# Patient Record
Sex: FEMALE | URBAN - METROPOLITAN AREA
[De-identification: names, ages, dates, MRNs, and addresses within clinical notes are randomized per-mention and may not be internally consistent; named-entity substitution may affect disease eponyms.]

---

## 2018-01-19 ENCOUNTER — INPATIENT (INPATIENT)
Facility: HOSPITAL | Age: 68
LOS: 4 days | Discharge: HOME HEALTH | End: 2018-01-24
Attending: FAMILY MEDICINE | Admitting: PHYSICIAN ASSISTANT
Payer: MEDICARE

## 2018-01-19 VITALS — HEIGHT: 67 IN | WEIGHT: 194.01 LBS

## 2018-01-19 LAB
ALBUMIN SERPL ELPH-MCNC: 3.8 G/DL — SIGNIFICANT CHANGE UP (ref 3.3–5)
ALP SERPL-CCNC: 104 U/L — SIGNIFICANT CHANGE UP (ref 40–120)
ALT FLD-CCNC: 45 U/L — SIGNIFICANT CHANGE UP (ref 12–78)
ANION GAP SERPL CALC-SCNC: 6 MMOL/L — SIGNIFICANT CHANGE UP (ref 5–17)
APTT BLD: 31 SEC — SIGNIFICANT CHANGE UP (ref 27.5–37.4)
AST SERPL-CCNC: 23 U/L — SIGNIFICANT CHANGE UP (ref 15–37)
BASOPHILS # BLD AUTO: 0.1 K/UL — SIGNIFICANT CHANGE UP (ref 0–0.2)
BASOPHILS NFR BLD AUTO: 0.9 % — SIGNIFICANT CHANGE UP (ref 0–2)
BILIRUB SERPL-MCNC: 0.6 MG/DL — SIGNIFICANT CHANGE UP (ref 0.2–1.2)
BLD GP AB SCN SERPL QL: SIGNIFICANT CHANGE UP
BUN SERPL-MCNC: 24 MG/DL — HIGH (ref 7–23)
CALCIUM SERPL-MCNC: 9.5 MG/DL — SIGNIFICANT CHANGE UP (ref 8.5–10.1)
CHLORIDE SERPL-SCNC: 105 MMOL/L — SIGNIFICANT CHANGE UP (ref 96–108)
CO2 SERPL-SCNC: 29 MMOL/L — SIGNIFICANT CHANGE UP (ref 22–31)
CREAT SERPL-MCNC: 1 MG/DL — SIGNIFICANT CHANGE UP (ref 0.5–1.3)
EOSINOPHIL # BLD AUTO: 0.1 K/UL — SIGNIFICANT CHANGE UP (ref 0–0.5)
EOSINOPHIL NFR BLD AUTO: 0.7 % — SIGNIFICANT CHANGE UP (ref 0–6)
ERYTHROCYTE [SEDIMENTATION RATE] IN BLOOD: 4 MM/HR — SIGNIFICANT CHANGE UP (ref 0–20)
GLUCOSE SERPL-MCNC: 120 MG/DL — HIGH (ref 70–99)
HCT VFR BLD CALC: 41 % — SIGNIFICANT CHANGE UP (ref 34.5–45)
HGB BLD-MCNC: 13.6 G/DL — SIGNIFICANT CHANGE UP (ref 11.5–15.5)
INR BLD: 1.05 RATIO — SIGNIFICANT CHANGE UP (ref 0.88–1.16)
LACTATE SERPL-SCNC: 0.9 MMOL/L — SIGNIFICANT CHANGE UP (ref 0.7–2)
LYMPHOCYTES # BLD AUTO: 2.6 K/UL — SIGNIFICANT CHANGE UP (ref 1–3.3)
LYMPHOCYTES # BLD AUTO: 24.7 % — SIGNIFICANT CHANGE UP (ref 13–44)
MCHC RBC-ENTMCNC: 28.5 PG — SIGNIFICANT CHANGE UP (ref 27–34)
MCHC RBC-ENTMCNC: 33.2 GM/DL — SIGNIFICANT CHANGE UP (ref 32–36)
MCV RBC AUTO: 86 FL — SIGNIFICANT CHANGE UP (ref 80–100)
MONOCYTES # BLD AUTO: 0.8 K/UL — SIGNIFICANT CHANGE UP (ref 0–0.9)
MONOCYTES NFR BLD AUTO: 8 % — SIGNIFICANT CHANGE UP (ref 2–14)
NEUTROPHILS # BLD AUTO: 6.8 K/UL — SIGNIFICANT CHANGE UP (ref 1.8–7.4)
NEUTROPHILS NFR BLD AUTO: 65.8 % — SIGNIFICANT CHANGE UP (ref 43–77)
PLATELET # BLD AUTO: 270 K/UL — SIGNIFICANT CHANGE UP (ref 150–400)
POTASSIUM SERPL-MCNC: 3.8 MMOL/L — SIGNIFICANT CHANGE UP (ref 3.5–5.3)
POTASSIUM SERPL-SCNC: 3.8 MMOL/L — SIGNIFICANT CHANGE UP (ref 3.5–5.3)
PROT SERPL-MCNC: 7.6 GM/DL — SIGNIFICANT CHANGE UP (ref 6–8.3)
PROTHROM AB SERPL-ACNC: 11.3 SEC — SIGNIFICANT CHANGE UP (ref 9.8–12.7)
RBC # BLD: 4.77 M/UL — SIGNIFICANT CHANGE UP (ref 3.8–5.2)
RBC # FLD: 12.7 % — SIGNIFICANT CHANGE UP (ref 10.3–14.5)
SODIUM SERPL-SCNC: 140 MMOL/L — SIGNIFICANT CHANGE UP (ref 135–145)
TYPE + AB SCN PNL BLD: SIGNIFICANT CHANGE UP
WBC # BLD: 10.4 K/UL — SIGNIFICANT CHANGE UP (ref 3.8–10.5)
WBC # FLD AUTO: 10.4 K/UL — SIGNIFICANT CHANGE UP (ref 3.8–10.5)

## 2018-01-19 PROCEDURE — 99285 EMERGENCY DEPT VISIT HI MDM: CPT

## 2018-01-19 PROCEDURE — 73140 X-RAY EXAM OF FINGER(S): CPT | Mod: 26,LT

## 2018-01-19 RX ORDER — VANCOMYCIN HCL 1 G
1000 VIAL (EA) INTRAVENOUS EVERY 12 HOURS
Refills: 0 | Status: DISCONTINUED | OUTPATIENT
Start: 2018-01-19 | End: 2018-01-22

## 2018-01-19 RX ORDER — VANCOMYCIN HCL 1 G
1000 VIAL (EA) INTRAVENOUS ONCE
Refills: 0 | Status: COMPLETED | OUTPATIENT
Start: 2018-01-19 | End: 2018-01-19

## 2018-01-19 RX ORDER — DOCUSATE SODIUM 100 MG
100 CAPSULE ORAL THREE TIMES A DAY
Refills: 0 | Status: DISCONTINUED | OUTPATIENT
Start: 2018-01-19 | End: 2018-01-24

## 2018-01-19 RX ORDER — SODIUM CHLORIDE 9 MG/ML
2000 INJECTION INTRAMUSCULAR; INTRAVENOUS; SUBCUTANEOUS ONCE
Refills: 0 | Status: COMPLETED | OUTPATIENT
Start: 2018-01-19 | End: 2018-01-19

## 2018-01-19 RX ORDER — SODIUM CHLORIDE 9 MG/ML
1000 INJECTION INTRAMUSCULAR; INTRAVENOUS; SUBCUTANEOUS
Refills: 0 | Status: DISCONTINUED | OUTPATIENT
Start: 2018-01-19 | End: 2018-01-24

## 2018-01-19 RX ORDER — OXYCODONE HYDROCHLORIDE 5 MG/1
10 TABLET ORAL EVERY 6 HOURS
Refills: 0 | Status: DISCONTINUED | OUTPATIENT
Start: 2018-01-19 | End: 2018-01-24

## 2018-01-19 RX ORDER — PIPERACILLIN AND TAZOBACTAM 4; .5 G/20ML; G/20ML
3.38 INJECTION, POWDER, LYOPHILIZED, FOR SOLUTION INTRAVENOUS EVERY 8 HOURS
Refills: 0 | Status: DISCONTINUED | OUTPATIENT
Start: 2018-01-19 | End: 2018-01-21

## 2018-01-19 RX ORDER — AMPICILLIN SODIUM AND SULBACTAM SODIUM 250; 125 MG/ML; MG/ML
3 INJECTION, POWDER, FOR SUSPENSION INTRAMUSCULAR; INTRAVENOUS ONCE
Refills: 0 | Status: COMPLETED | OUTPATIENT
Start: 2018-01-19 | End: 2018-01-19

## 2018-01-19 RX ORDER — ACETAMINOPHEN 500 MG
650 TABLET ORAL ONCE
Refills: 0 | Status: COMPLETED | OUTPATIENT
Start: 2018-01-19 | End: 2018-01-19

## 2018-01-19 RX ADMIN — SODIUM CHLORIDE 2000 MILLILITER(S): 9 INJECTION INTRAMUSCULAR; INTRAVENOUS; SUBCUTANEOUS at 21:49

## 2018-01-19 RX ADMIN — AMPICILLIN SODIUM AND SULBACTAM SODIUM 200 GRAM(S): 250; 125 INJECTION, POWDER, FOR SUSPENSION INTRAMUSCULAR; INTRAVENOUS at 21:50

## 2018-01-19 RX ADMIN — Medication 250 MILLIGRAM(S): at 23:29

## 2018-01-19 RX ADMIN — Medication 650 MILLIGRAM(S): at 21:49

## 2018-01-19 NOTE — ED STATDOCS - SKIN, MLM
skin normal color for race, warm, dry and intact. Erythema and fluctuance to PIP of left index finger with localized tenderness to the PIP.  No drainage, erythema.  + tracking up LUE forearm with warmth and erythema associated with second and third MCP on dorsal side of the left hand.

## 2018-01-19 NOTE — ED STATDOCS - MEDICAL DECISION MAKING DETAILS
Pt with left pointer finger lymphangitis s/p lac 2 weeks ago.  Will give IV abx and consult Ortho Hand. Statement Selected

## 2018-01-19 NOTE — CONSULT NOTE ADULT - ASSESSMENT
A/P: 67y Female with L index finger and forearm cellulitis  Pain control  WBAT  elevation   Active movement of fingers encouraged  warm compresses  IV abx, ID recs appreciated  monitor clinically for improvement  no signs of septic arthritis at this point  discussed with dr oliveros  will monitor for possible I&D vs aspiration depending on response to abx  will follow

## 2018-01-19 NOTE — ED STATDOCS - PROGRESS NOTE DETAILS
left 2nd digit with erythema LROM at PIP, and minimally at DIP, swelling over dorsum of ;eft 2nd PIP, subtle streaking up hand to distal forearm, otherwise pt with FROM of wrist, radial and ulnar pulses 2+, FROM at wrist, capillary brisk WNL throughout finger tips.  strength 4/5 due to pain, no focal motor or sensory deficits. I spoke with Dr White, he agrees with the need for admission IV abx, he will come to asses pt in AM, Dr Gutierrez to admit pt to hospitalist service. I discussed with pt and  at bedside tx plan and need for abx administration

## 2018-01-19 NOTE — ED STATDOCS - ATTENDING CONTRIBUTION TO CARE
Attending Contribution to Care: I, Brandi Sabillon, performed the initial face to face bedside interview with this patient regarding history of present illness, review of symptoms and relevant past medical, social and family history.  I completed an independent physical examination.  I was the initial provider who evaluated this patient. I have signed out the follow up of any pending tests (i.e. labs, radiological studies) to the ACP.  I have communicated the patient’s plan of care and disposition with the ACP.

## 2018-01-19 NOTE — CONSULT NOTE ADULT - SUBJECTIVE AND OBJECTIVE BOX
67y Female RHD presents c/o L Index finger pain/swelling/erythema sp knife laceration 12 days ago. Denies Denies numbness/tingling. Denies fever/chills. Denies pain/injury elsewhere. No other complaints. States she was cutting up dinner and ended up cutting over the dorsal aspect of her left index PIP joint, left small finger PIP joint and right small finger PIP joint. States that she treated it on her own at home without sequelae. Yesterday however, her left index finger PIP began to become erythematous/swollen and painful. She attempted warm soaks. She went to urgent care today and was sent to the ED for possible cellulitis and lymphangitis and the erythema began tracking up the forearm. No other illnesses or infections recently. She has in the past (many years ago) has herpetic jovan in one of her fingers which required an I&D.     HEALTH ISSUES - PROBLEM Dx:        MEDICATIONS  (STANDING):  sodium chloride 0.9%. 1000 milliLiter(s) IV Continuous <Continuous>  vancomycin  IVPB 1000 milliGRAM(s) IV Intermittent once    Allergies    latex (Unknown)  sulfa drugs (Unknown)    Intolerances                                13.6   10.4  )-----------( 270      ( 19 Jan 2018 21:25 )             41.0     19 Jan 2018 21:25    140    |  105    |  24     ----------------------------<  120    3.8     |  29     |  1.00     Ca    9.5        19 Jan 2018 21:25    TPro  7.6    /  Alb  3.8    /  TBili  0.6    /  DBili  x      /  AST  23     /  ALT  45     /  AlkPhos  104    19 Jan 2018 21:25    PT/INR - ( 19 Jan 2018 21:25 )   PT: 11.3 sec;   INR: 1.05 ratio         PTT - ( 19 Jan 2018 21:25 )  PTT:31.0 sec  Vital Signs Last 24 Hrs  T(C): 36.9 (01-19-18 @ 20:41), Max: 36.9 (01-19-18 @ 20:41)  T(F): 98.4 (01-19-18 @ 20:41), Max: 98.4 (01-19-18 @ 20:41)  HR: 89 (01-19-18 @ 20:41) (89 - 89)  BP: 118/74 (01-19-18 @ 20:41) (118/74 - 118/74)  BP(mean): --  RR: 16 (01-19-18 @ 20:41) (16 - 16)  SpO2: 100% (01-19-18 @ 20:41) (100% - 100%)    Imaging: XR demonstrates no fx/dislocation or foreign body     Physical Exam  Gen: NAD  LUE: Skin intact, compartments soft, warm well perfused, rdial pulse intact, +r/u/m/ain/pin function, silt, +0.5 cm scab over dorsal index finger PIP joint with swelling/erythema and TTP, AROM with pain of index PIP joint limited, PROM index PIP joint 0-45 with pain at end flexion, +erythema tracking proximally up forearm, +small scab with fluid collection over  dorsal aspect of small finger PIP joint without erythema/TTP and full painless AROM, full AROM without pain to all other fingers without erythema or signs of infection  R Hand: +small scab over dorsal aspect of small finger pip joint with fluid collection without erythema/TTP, full painless AROM all joint, no signs of infection/cellulitis/phlebitis, NV intact

## 2018-01-19 NOTE — ED STATDOCS - OBJECTIVE STATEMENT
67f presents with abscess to left pointer finger.  Pt states on 1/6/18 she accidentally cut the knuckles on her left pinky, left pointer, and right pinky fingers with a knife.  A few days later she developed redness, swelling, pain to the left index finger.

## 2018-01-19 NOTE — ED ADULT NURSE NOTE - OBJECTIVE STATEMENT
Patient comes to ED for left index finger possible infection. pt cut finger on a knife a few days ago and swelling and redness began. pt noticed 4 hours later streaking up her arm.

## 2018-01-19 NOTE — H&P ADULT - HISTORY OF PRESENT ILLNESS
68 yo F with no significant PMH, p/w L 2nd digit swelling. Patient states she cut her finger on 1/6/18. It seemed like it was healing initially, but then she started noticing that it became boggy around her PIP joint and the swelling spread to her DIP joint, to her wrist and started tracking down her arm. Patient is not able to make a fist due to pain. Denies fever / chills / nausea /vomiting / CP / SOB.     PSH: hip surgery, gastric sleeve  Family Hx: Sister - breast cancer  Social hx: tobacco - quit 30 years ago, Denies etoh / drugs, lives at home with family

## 2018-01-19 NOTE — H&P ADULT - ASSESSMENT
68 yo F with no significant PMH, p/w L 2nd digit swelling    *L 2nd digit cellulitis   -ESR / CRP -> based on results may need MRI to r/o OM  -Vanco / zosyn  -Ortho hand consult  -ID consult  -Blood cx  -Pain control PRN  -Lactate = 0.9    *Dehydration   -Elevated BUN / creatinine ratio with dry oral mucosa  -IVF     *DVT ppx  -SCDS 66 yo F with no significant PMH, p/w L 2nd digit swelling    *L 2nd digit cellulitis   -ESR / CRP -> based on results may need MRI to r/o OM  -Vanco / zosyn  -Ortho hand consult  -ID consult  -Blood cx  -Pain control PRN  -Lactate = 0.9    *Dehydration   -Elevated BUN / creatinine ratio with dry oral mucosa  -IVF     *DVT ppx  -SCDS

## 2018-01-20 LAB
ABO RH CONFIRMATION: SIGNIFICANT CHANGE UP
ALBUMIN SERPL ELPH-MCNC: 3 G/DL — LOW (ref 3.3–5)
ALP SERPL-CCNC: 78 U/L — SIGNIFICANT CHANGE UP (ref 40–120)
ALT FLD-CCNC: 30 U/L — SIGNIFICANT CHANGE UP (ref 12–78)
ANION GAP SERPL CALC-SCNC: 5 MMOL/L — SIGNIFICANT CHANGE UP (ref 5–17)
AST SERPL-CCNC: 17 U/L — SIGNIFICANT CHANGE UP (ref 15–37)
BASOPHILS # BLD AUTO: 0.1 K/UL — SIGNIFICANT CHANGE UP (ref 0–0.2)
BASOPHILS NFR BLD AUTO: 1.3 % — SIGNIFICANT CHANGE UP (ref 0–2)
BILIRUB SERPL-MCNC: 0.7 MG/DL — SIGNIFICANT CHANGE UP (ref 0.2–1.2)
BUN SERPL-MCNC: 16 MG/DL — SIGNIFICANT CHANGE UP (ref 7–23)
CALCIUM SERPL-MCNC: 8.5 MG/DL — SIGNIFICANT CHANGE UP (ref 8.5–10.1)
CHLORIDE SERPL-SCNC: 110 MMOL/L — HIGH (ref 96–108)
CO2 SERPL-SCNC: 29 MMOL/L — SIGNIFICANT CHANGE UP (ref 22–31)
CREAT SERPL-MCNC: 0.76 MG/DL — SIGNIFICANT CHANGE UP (ref 0.5–1.3)
CRP SERPL-MCNC: 0.6 MG/DL — HIGH (ref 0–0.4)
EOSINOPHIL # BLD AUTO: 0.1 K/UL — SIGNIFICANT CHANGE UP (ref 0–0.5)
EOSINOPHIL NFR BLD AUTO: 1.6 % — SIGNIFICANT CHANGE UP (ref 0–6)
GLUCOSE SERPL-MCNC: 93 MG/DL — SIGNIFICANT CHANGE UP (ref 70–99)
HCT VFR BLD CALC: 36.4 % — SIGNIFICANT CHANGE UP (ref 34.5–45)
HGB BLD-MCNC: 12 G/DL — SIGNIFICANT CHANGE UP (ref 11.5–15.5)
LACTATE SERPL-SCNC: 0.7 MMOL/L — SIGNIFICANT CHANGE UP (ref 0.7–2)
LYMPHOCYTES # BLD AUTO: 2.6 K/UL — SIGNIFICANT CHANGE UP (ref 1–3.3)
LYMPHOCYTES # BLD AUTO: 35.9 % — SIGNIFICANT CHANGE UP (ref 13–44)
MAGNESIUM SERPL-MCNC: 2.1 MG/DL — SIGNIFICANT CHANGE UP (ref 1.6–2.6)
MCHC RBC-ENTMCNC: 28.7 PG — SIGNIFICANT CHANGE UP (ref 27–34)
MCHC RBC-ENTMCNC: 32.9 GM/DL — SIGNIFICANT CHANGE UP (ref 32–36)
MCV RBC AUTO: 87.3 FL — SIGNIFICANT CHANGE UP (ref 80–100)
MONOCYTES # BLD AUTO: 0.8 K/UL — SIGNIFICANT CHANGE UP (ref 0–0.9)
MONOCYTES NFR BLD AUTO: 10.4 % — SIGNIFICANT CHANGE UP (ref 2–14)
NEUTROPHILS # BLD AUTO: 3.7 K/UL — SIGNIFICANT CHANGE UP (ref 1.8–7.4)
NEUTROPHILS NFR BLD AUTO: 50.8 % — SIGNIFICANT CHANGE UP (ref 43–77)
PHOSPHATE SERPL-MCNC: 3.7 MG/DL — SIGNIFICANT CHANGE UP (ref 2.5–4.5)
PLATELET # BLD AUTO: 212 K/UL — SIGNIFICANT CHANGE UP (ref 150–400)
POTASSIUM SERPL-MCNC: 4.2 MMOL/L — SIGNIFICANT CHANGE UP (ref 3.5–5.3)
POTASSIUM SERPL-SCNC: 4.2 MMOL/L — SIGNIFICANT CHANGE UP (ref 3.5–5.3)
PROT SERPL-MCNC: 5.8 GM/DL — LOW (ref 6–8.3)
RBC # BLD: 4.16 M/UL — SIGNIFICANT CHANGE UP (ref 3.8–5.2)
RBC # FLD: 12.6 % — SIGNIFICANT CHANGE UP (ref 10.3–14.5)
SODIUM SERPL-SCNC: 144 MMOL/L — SIGNIFICANT CHANGE UP (ref 135–145)
WBC # BLD: 7.3 K/UL — SIGNIFICANT CHANGE UP (ref 3.8–10.5)
WBC # FLD AUTO: 7.3 K/UL — SIGNIFICANT CHANGE UP (ref 3.8–10.5)

## 2018-01-20 RX ADMIN — Medication 250 MILLIGRAM(S): at 22:33

## 2018-01-20 RX ADMIN — Medication 250 MILLIGRAM(S): at 00:48

## 2018-01-20 RX ADMIN — Medication 250 MILLIGRAM(S): at 11:41

## 2018-01-20 RX ADMIN — PIPERACILLIN AND TAZOBACTAM 25 GRAM(S): 4; .5 INJECTION, POWDER, LYOPHILIZED, FOR SOLUTION INTRAVENOUS at 05:23

## 2018-01-20 RX ADMIN — PIPERACILLIN AND TAZOBACTAM 25 GRAM(S): 4; .5 INJECTION, POWDER, LYOPHILIZED, FOR SOLUTION INTRAVENOUS at 15:52

## 2018-01-20 NOTE — PROGRESS NOTE ADULT - SUBJECTIVE AND OBJECTIVE BOX
Patient seen and examined. Pain controlled. States fullness in left index finger and hand seems mildly improved, erythema slightly improved. denies fever/chills.     HEALTH ISSUES - PROBLEM Dx:        MEDICATIONS  (STANDING):  sodium chloride 0.9%. 1000 milliLiter(s) IV Continuous <Continuous>  vancomycin  IVPB 1000 milliGRAM(s) IV Intermittent once    Allergies    latex (Unknown)  sulfa drugs (Unknown)    Intolerances                                13.6   10.4  )-----------( 270      ( 19 Jan 2018 21:25 )             41.0     19 Jan 2018 21:25    140    |  105    |  24     ----------------------------<  120    3.8     |  29     |  1.00     Ca    9.5        19 Jan 2018 21:25    TPro  7.6    /  Alb  3.8    /  TBili  0.6    /  DBili  x      /  AST  23     /  ALT  45     /  AlkPhos  104    19 Jan 2018 21:25    PT/INR - ( 19 Jan 2018 21:25 )   PT: 11.3 sec;   INR: 1.05 ratio         PTT - ( 19 Jan 2018 21:25 )  PTT:31.0 sec  Vital Signs Last 24 Hrs  T(C): 36.9 (01-19-18 @ 20:41), Max: 36.9 (01-19-18 @ 20:41)  T(F): 98.4 (01-19-18 @ 20:41), Max: 98.4 (01-19-18 @ 20:41)  HR: 89 (01-19-18 @ 20:41) (89 - 89)  BP: 118/74 (01-19-18 @ 20:41) (118/74 - 118/74)  BP(mean): --  RR: 16 (01-19-18 @ 20:41) (16 - 16)  SpO2: 100% (01-19-18 @ 20:41) (100% - 100%)    Physical Exam  Gen: NAD  LUE: Skin intact, compartments soft, warm well perfused, rdial pulse intact, +r/u/m/ain/pin function, silt, +0.5 cm scab over dorsal index finger PIP joint with swelling/erythema and TTP, AROM with pain of index PIP joint limited, PROM index PIP joint 0-45 with pain at end flexion, +erythema tracking proximally up forearm which was demarcated and slightly improved today, +small scab with fluid collection over  dorsal aspect of small finger PIP joint without erythema/TTP and full painless AROM, full AROM without pain to all other fingers without erythema or signs of infection  R Hand: +small scab over dorsal aspect of small finger pip joint with fluid collection without erythema/TTP, full painless AROM all joint, no signs of infection/cellulitis/phlebitis, NV intact

## 2018-01-20 NOTE — PROGRESS NOTE ADULT - SUBJECTIVE AND OBJECTIVE BOX
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 Patient states she cut her finger on 1/6/18. It seemed like it was healing initially, but then she started noticing that it became boggy around her PIP joint and the swelling spread to her DIP joint, to her wrist and started tracking down her arm. Patient was not able to make a fist due to pain. Denied fever / chills / nausea /vomiting / CP / SOB. She was admitted with cellulitis/lymphangitis and to r/o underlying abscess/OM/tenosynovitis.    1/20: pt seen and examined this am. Proximal streaking improving, but joint still swollen/tender and red.      Review of system- All 10 systems reviewed and is as per HPI otherwise negative.       VITALS  T(C): 36.7 (01-20-18 @ 12:01), Max: 36.9 (01-19-18 @ 20:41)  HR: 73 (01-20-18 @ 12:01) (64 - 89)  BP: 101/66 (01-20-18 @ 12:01) (92/43 - 118/74)  RR: 16 (01-20-18 @ 12:01) (15 - 16)  SpO2: 100% (01-20-18 @ 12:01) (98% - 100%)  Wt(kg): --    PHYSICAL EXAM:    GENERAL: Comfortable, no acute distress  HEAD:  Atraumatic, Normocephalic  EYES: EOMI, PERRLA  HEENT: Moist mucous membranes  NECK: Supple, No JVD  NERVOUS SYSTEM:  Alert & Oriented X3, Motor Strength 5/5 B/L upper and lower extremities  CHEST/LUNG: Clear to auscultation bilaterally  HEART: Regular rate and rhythm; No murmurs, rubs, or gallops  ABDOMEN: Soft, Nontender, Nondistended; Bowel sounds present  GENITOURINARY- Voiding, no palpable bladder  EXTREMITIES:  Left 2nd digit with erythema/swelling/tenderness over PIP joint, no active drainage, some fading proximal streaking.  MUSCULOSKELETAL- No muscle tenderness,   SKIN-no rash        LABS:                        12.0   7.3   )-----------( 212      ( 20 Jan 2018 05:50 )             36.4     01-20    144  |  110<H>  |  16  ----------------------------<  93  4.2   |  29  |  0.76    Ca    8.5      20 Jan 2018 05:50  Phos  3.7     01-20  Mg     2.1     01-20    TPro  5.8<L>  /  Alb  3.0<L>  /  TBili  0.7  /  DBili  x   /  AST  17  /  ALT  30  /  AlkPhos  78  01-20    PT/INR - ( 19 Jan 2018 21:25 )   PT: 11.3 sec;   INR: 1.05 ratio         PTT - ( 19 Jan 2018 21:25 )  PTT:31.0 sec      CAPILLARY BLOOD GLUCOSE    MEDS  docusate sodium 100 milliGRAM(s) Oral three times a day PRN  oxyCODONE    IR 10 milliGRAM(s) Oral every 6 hours PRN  piperacillin/tazobactam IVPB. 3.375 Gram(s) IV Intermittent every 8 hours  sodium chloride 0.9%. 1000 milliLiter(s) IV Continuous <Continuous>  vancomycin  IVPB 1000 milliGRAM(s) IV Intermittent every 12 hours    ASSESSMENT AND PLAN:  67F, PMH AS ABOVE A/W:    1. Left 2nd digit cellulitis with possible underlying abscess/tenosynovitis vs. om  -ID eval appreciated  -continue iv abx.  -may need further imaging vs. I+D  -ortho following  -f/u blood cx  -pain control    2. Dehydration:  -improving with ivf    3. 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 Patient states she cut her finger on 1/6/18. It seemed like it was healing initially, but then she started noticing that it became boggy around her PIP joint and the swelling spread to her DIP joint, to her wrist and started tracking down her arm. Patient was not able to make a fist due to pain. Denied fever / chills / nausea /vomiting / CP / SOB. She was admitted with cellulitis/lymphangitis and to r/o underlying abscess/OM/tenosynovitis.    1/20: pt seen and examined this am. Proximal streaking improving, but joint still swollen/tender and red.      Review of system- All 10 systems reviewed and is as per HPI otherwise negative.       VITALS  T(C): 36.7 (01-20-18 @ 12:01), Max: 36.9 (01-19-18 @ 20:41)  HR: 73 (01-20-18 @ 12:01) (64 - 89)  BP: 101/66 (01-20-18 @ 12:01) (92/43 - 118/74)  RR: 16 (01-20-18 @ 12:01) (15 - 16)  SpO2: 100% (01-20-18 @ 12:01) (98% - 100%)  Wt(kg): --    PHYSICAL EXAM:    GENERAL: Comfortable, no acute distress  HEAD:  Atraumatic, Normocephalic  EYES: EOMI, PERRLA  HEENT: Moist mucous membranes  NECK: Supple, No JVD  NERVOUS SYSTEM:  Alert & Oriented X3, Motor Strength 5/5 B/L upper and lower extremities  CHEST/LUNG: Clear to auscultation bilaterally  HEART: Regular rate and rhythm; No murmurs, rubs, or gallops  ABDOMEN: Soft, Nontender, Nondistended; Bowel sounds present  GENITOURINARY- Voiding, no palpable bladder  EXTREMITIES:  Left 2nd digit with erythema/swelling/tenderness over PIP joint, no active drainage, some fading proximal streaking.  MUSCULOSKELETAL- No muscle tenderness,   SKIN-no rash        LABS:                        12.0   7.3   )-----------( 212      ( 20 Jan 2018 05:50 )             36.4     01-20    144  |  110<H>  |  16  ----------------------------<  93  4.2   |  29  |  0.76    Ca    8.5      20 Jan 2018 05:50  Phos  3.7     01-20  Mg     2.1     01-20    TPro  5.8<L>  /  Alb  3.0<L>  /  TBili  0.7  /  DBili  x   /  AST  17  /  ALT  30  /  AlkPhos  78  01-20    PT/INR - ( 19 Jan 2018 21:25 )   PT: 11.3 sec;   INR: 1.05 ratio         PTT - ( 19 Jan 2018 21:25 )  PTT:31.0 sec      CAPILLARY BLOOD GLUCOSE    MEDS  docusate sodium 100 milliGRAM(s) Oral three times a day PRN  oxyCODONE    IR 10 milliGRAM(s) Oral every 6 hours PRN  piperacillin/tazobactam IVPB. 3.375 Gram(s) IV Intermittent every 8 hours  sodium chloride 0.9%. 1000 milliLiter(s) IV Continuous <Continuous>  vancomycin  IVPB 1000 milliGRAM(s) IV Intermittent every 12 hours    ASSESSMENT AND PLAN:  67F, PMH AS ABOVE A/W:    1. Left 2nd digit cellulitis with possible deep tissue involvement:  -ID eval appreciated  -continue iv abx.  -may need further imaging vs. I+D  -ortho following  -f/u blood cx  -pain control    2. Dehydration:  -improving with ivf    3. dvt px:  ambulate,

## 2018-01-20 NOTE — CONSULT NOTE ADULT - SUBJECTIVE AND OBJECTIVE BOX
HPI:  66 yo F with past medical history gastric sleeve and hip surgery in past now admitted on 1/19 for evaluation of left second finger infection; of note the patient cut her finger on a filet knife on 1/6 that was sitting in dishwater; she cleaned the wound with "organic antibiotic-silver colloid" which is made with distilled water. Subsequently a pipe broke in her house and she used towel to clean up the dirty basement water and exposed her finger to this as well. Over time the second PIP joint became swollen and boggy with crusting at the initial skin defect, redness started to develop distally and up her wrist, tracking up her arm prompting visit to [facility], referred to ED. She can not bend the finger, denies any other specific complaint, no fever.        PMH: as above  PSH: as above  Meds: per reconcilation sheet, noted below  MEDICATIONS  (STANDING):  piperacillin/tazobactam IVPB. 3.375 Gram(s) IV Intermittent every 8 hours  sodium chloride 0.9%. 1000 milliLiter(s) (75 mL/Hr) IV Continuous <Continuous>  vancomycin  IVPB 1000 milliGRAM(s) IV Intermittent every 12 hours    MEDICATIONS  (PRN):  docusate sodium 100 milliGRAM(s) Oral three times a day PRN Constipation  oxyCODONE    IR 10 milliGRAM(s) Oral every 6 hours PRN Moderate Pain (4 - 6)    Allergies    latex (Unknown)  sulfa drugs (Unknown)    Intolerances      Social: past smoking, no alcohol, no illegal drugs; no recent travel, no exposure to TB  FAMILY HISTORY:    ROS: the patient has no fever, no chills, no HA, no dizziness, no sore throat, no blurry vision, no CP, no palpitations, no SOB, no cough, no abdominal pain, no diarrhea, no N/V, no dysuria, no leg pain, no claudication, no rash,  no rectal pain or bleeding, no night sweats  Vital Signs Last 24 Hrs  T(C): 36.7 (20 Jan 2018 12:01), Max: 36.9 (19 Jan 2018 20:41)  T(F): 98 (20 Jan 2018 12:01), Max: 98.4 (19 Jan 2018 20:41)  HR: 73 (20 Jan 2018 12:01) (64 - 89)  BP: 101/66 (20 Jan 2018 12:01) (92/43 - 118/74)  BP(mean): --  RR: 16 (20 Jan 2018 12:01) (15 - 16)  SpO2: 100% (20 Jan 2018 12:01) (98% - 100%)  Daily Height in cm: 170.18 (19 Jan 2018 20:35)    Daily   Constitutional: nontoxic appearing  HEENT: NC/AT, EOMI, PERRLA  Neck: supple  Respiratory: clear, no r/r/w  Cardiovascular: S1S2 regular, no murmurs  Abdomen: soft, not tender, not distended, positive BS  Genitourinary: deferred  Rectal: deferred  Musculoskeletal: left second finger with marked edema, bogginess over PIP joint with erythema from tip of finger to wrist, tender to touch, eschar over PIP  Neurological: AxOx3, moving all extremities, no focal deficits  Skin: no rashes                          12.0   7.3   )-----------( 212      ( 20 Jan 2018 05:50 )             36.4     01-20    144  |  110<H>  |  16  ----------------------------<  93  4.2   |  29  |  0.76    Ca    8.5      20 Jan 2018 05:50  Phos  3.7     01-20  Mg     2.1     01-20    TPro  5.8<L>  /  Alb  3.0<L>  /  TBili  0.7  /  DBili  x   /  AST  17  /  ALT  30  /  AlkPhos  78  01-20     LIVER FUNCTIONS - ( 20 Jan 2018 05:50 )  Alb: 3.0 g/dL / Pro: 5.8 gm/dL / ALK PHOS: 78 U/L / ALT: 30 U/L / AST: 17 U/L / GGT: x                 Radiology:< from: Xray Finger, Left Hand (01.19.18 @ 21:23) >  IMPRESSION:   No fracture or dislocation. Narrowing at the first carpal   metacarpal joint space.           < end of copied text >      Advanced directive addressed: full resuscitation

## 2018-01-20 NOTE — PATIENT PROFILE ADULT. - VISION (WITH CORRECTIVE LENSES IF THE PATIENT USUALLY WEARS THEM):
adequate with eyeglasses/Partially impaired: cannot see medication labels or newsprint, but can see obstacles in path, and the surrounding layout; can count fingers at arm's length

## 2018-01-20 NOTE — PROGRESS NOTE ADULT - ASSESSMENT
A/P: 67y Female with L index finger and forearm cellulitis  Pain control  WBAT  elevation   Active movement of fingers encouraged  warm compresses  IV abx, ID recs appreciated  monitor clinically for improvement  no signs of septic arthritis at this point  will monitor for possible I&D vs aspiration depending on response to abx  will follow

## 2018-01-20 NOTE — CONSULT NOTE ADULT - ASSESSMENT
66 yo F with past medical history gastric sleeve and hip surgery in past now admitted on 1/19 for evaluation of left second finger infection; of note the patient cut her finger on a filet knife on 1/6 that was sitting in dishwater; she cleaned the wound with "organic antibiotic-silver colloid" which is made with distilled water. Subsequently a pipe broke in her house and she used towel to clean up the dirty basement water and exposed her finger to this as well. Over time the second PIP joint became swollen and boggy with crusting at the initial skin defect, redness started to develop distally and up her wrist, tracking up her arm prompting visit to [facility], referred to ED. She can not bend the finger, denies any other specific complaint, no fever.  1. Patient admitted with left hand cellulitis, possible septic arthritis versus tenosynovitis, versus osteomyelitis  - follow up cultures   - iv hydration and supportive care   - serial cbc and monitor temperature   - agree with zosyn as ordered given the water exposures, to cover Pseudomonas, gram negative rods, staph, streps, anaerobes  - will add vancomycin to treat resistant bacteria   - check vancomycin trough prior to fourth dose   - should have ortho hand evaluation, most likely would benefit from I&D  - probably will need home iv antibiotics  Will follow

## 2018-01-21 RX ORDER — DIAZEPAM 5 MG
5 TABLET ORAL ONCE
Refills: 0 | Status: DISCONTINUED | OUTPATIENT
Start: 2018-01-21 | End: 2018-01-21

## 2018-01-21 RX ORDER — PIPERACILLIN AND TAZOBACTAM 4; .5 G/20ML; G/20ML
3.38 INJECTION, POWDER, LYOPHILIZED, FOR SOLUTION INTRAVENOUS EVERY 8 HOURS
Refills: 0 | Status: DISCONTINUED | OUTPATIENT
Start: 2018-01-21 | End: 2018-01-22

## 2018-01-21 RX ORDER — LACTOBACILLUS ACIDOPHILUS 100MM CELL
1 CAPSULE ORAL
Refills: 0 | Status: DISCONTINUED | OUTPATIENT
Start: 2018-01-21 | End: 2018-01-24

## 2018-01-21 RX ADMIN — Medication 1 TABLET(S): at 17:22

## 2018-01-21 RX ADMIN — Medication 250 MILLIGRAM(S): at 06:18

## 2018-01-21 RX ADMIN — PIPERACILLIN AND TAZOBACTAM 25 GRAM(S): 4; .5 INJECTION, POWDER, LYOPHILIZED, FOR SOLUTION INTRAVENOUS at 21:54

## 2018-01-21 RX ADMIN — PIPERACILLIN AND TAZOBACTAM 25 GRAM(S): 4; .5 INJECTION, POWDER, LYOPHILIZED, FOR SOLUTION INTRAVENOUS at 01:20

## 2018-01-21 RX ADMIN — Medication 100 MILLIGRAM(S): at 15:20

## 2018-01-21 RX ADMIN — Medication 250 MILLIGRAM(S): at 17:22

## 2018-01-21 RX ADMIN — Medication 5 MILLIGRAM(S): at 10:46

## 2018-01-21 RX ADMIN — PIPERACILLIN AND TAZOBACTAM 25 GRAM(S): 4; .5 INJECTION, POWDER, LYOPHILIZED, FOR SOLUTION INTRAVENOUS at 10:47

## 2018-01-21 NOTE — CHART NOTE - NSCHARTNOTEFT_GEN_A_CORE
Pt currently on Cardiac (DASH/TLC) diet . No history indicates therapeutic diet warranted at this time.   Pt complaining and requesting regular diet.   Recommendation:  1) Change diet to regular

## 2018-01-21 NOTE — PROGRESS NOTE ADULT - SUBJECTIVE AND OBJECTIVE BOX
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 Patient states she cut her finger on 1/6/18. It seemed like it was healing initially, but then she started noticing that it became boggy around her PIP joint and the swelling spread to her DIP joint, to her wrist and started tracking down her arm. Patient was not able to make a fist due to pain. Denied fever / chills / nausea /vomiting / CP / SOB. She was admitted with cellulitis/lymphangitis and to r/o underlying abscess/OM/tenosynovitis.    1/21: pt seen and examined this am. Feeling alittle better. No pain at affected site, more stiffness. no diarrhea.     Review of system- All 10 systems reviewed and is as per HPI otherwise negative.   Vital Signs Last 24 Hrs  T(C): 36.4 (21 Jan 2018 05:20), Max: 36.8 (20 Jan 2018 22:34)  T(F): 97.6 (21 Jan 2018 05:20), Max: 98.2 (20 Jan 2018 22:34)  HR: 72 (21 Jan 2018 05:20) (70 - 73)  BP: 115/55 (21 Jan 2018 05:20) (101/66 - 115/55)  RR: 16 (21 Jan 2018 05:20) (16 - 16)  SpO2: 98% (21 Jan 2018 05:20) (98% - 100%)  PHYSICAL EXAM:    GENERAL: Comfortable, no acute distress  HEAD:  Atraumatic, Normocephalic  EYES: EOMI, PERRLA  HEENT: Moist mucous membranes  NECK: Supple, No JVD  NERVOUS SYSTEM:  Alert & Oriented X3, Motor Strength 5/5 B/L upper and lower extremities  CHEST/LUNG: Clear to auscultation bilaterally  HEART: Regular rate and rhythm; No murmurs, rubs, or gallops  ABDOMEN: Soft, Nontender, Nondistended; Bowel sounds present  GENITOURINARY- Voiding, no palpable bladder  EXTREMITIES:  Left 2nd digit with erythema/swelling over PIP joint, no active drainage,  fading proximal streaking.  MUSCULOSKELETAL- No muscle tenderness,   SKIN-no rash    LABS:                        12.0   7.3   )-----------( 212      ( 20 Jan 2018 05:50 )             36.4     01-20    144  |  110<H>  |  16  ----------------------------<  93  4.2   |  29  |  0.76    Ca    8.5      20 Jan 2018 05:50  Phos  3.7     01-20  Mg     2.1     01-20    TPro  5.8<L>  /  Alb  3.0<L>  /  TBili  0.7  /  DBili  x   /  AST  17  /  ALT  30  /  AlkPhos  78  01-20    PT/INR - ( 19 Jan 2018 21:25 )   PT: 11.3 sec;   INR: 1.05 ratio         PTT - ( 19 Jan 2018 21:25 )  PTT:31.0 sec        MEDS  docusate sodium 100 milliGRAM(s) Oral three times a day PRN  oxyCODONE    IR 10 milliGRAM(s) Oral every 6 hours PRN  piperacillin/tazobactam IVPB. 3.375 Gram(s) IV Intermittent every 8 hours  sodium chloride 0.9%. 1000 milliLiter(s) IV Continuous <Continuous>  vancomycin  IVPB 1000 milliGRAM(s) IV Intermittent every 12 hours    ASSESSMENT AND PLAN:  67F, PMH AS ABOVE A/W:    1. Left 2nd digit cellulitis with possible deep tissue involvement:  -ID eval appreciated  -continue iv abx.  -ortho following-->will likely need I+D  -blood cx neg so far  -pain control    2. Dehydration:  -improved with ivf    3. dvt px:  ambulate,

## 2018-01-21 NOTE — PROGRESS NOTE ADULT - SUBJECTIVE AND OBJECTIVE BOX
HPI:  66 yo F with past medical history gastric sleeve and hip surgery in past now admitted on 1/19 for evaluation of left second finger infection; of note the patient cut her finger on a filet knife on 1/6 that was sitting in dishwater; she cleaned the wound with "organic antibiotic-silver colloid" which is made with distilled water. Subsequently a pipe broke in her house and she used towel to clean up the dirty basement water and exposed her finger to this as well. Over time the second PIP joint became swollen and boggy with crusting at the initial skin defect, redness started to develop distally and up her wrist, tracking up her arm prompting visit to [facility], referred to ED. She can not bend the finger, denies any other specific complaint, no fever.  Today 1/21 patient notes the area still to be painful      MEDICATIONS  (STANDING):  piperacillin/tazobactam IVPB. 3.375 Gram(s) IV Intermittent every 8 hours  sodium chloride 0.9%. 1000 milliLiter(s) (75 mL/Hr) IV Continuous <Continuous>  vancomycin  IVPB 1000 milliGRAM(s) IV Intermittent every 12 hours    MEDICATIONS  (PRN):  docusate sodium 100 milliGRAM(s) Oral three times a day PRN Constipation  oxyCODONE    IR 10 milliGRAM(s) Oral every 6 hours PRN Moderate Pain (4 - 6)      Vital Signs Last 24 Hrs  T(C): 36.4 (21 Jan 2018 05:20), Max: 36.8 (20 Jan 2018 22:34)  T(F): 97.6 (21 Jan 2018 05:20), Max: 98.2 (20 Jan 2018 22:34)  HR: 72 (21 Jan 2018 05:20) (70 - 73)  BP: 115/55 (21 Jan 2018 05:20) (101/66 - 115/55)  BP(mean): --  RR: 16 (21 Jan 2018 05:20) (16 - 16)  SpO2: 98% (21 Jan 2018 05:20) (98% - 100%)    Physical Exam:        Daily   Constitutional: nontoxic appearing  HEENT: NC/AT, EOMI, PERRLA  Neck: supple  Respiratory: clear, no r/r/w  Cardiovascular: S1S2 regular, no murmurs  Abdomen: soft, not tender, not distended, positive BS  Genitourinary: deferred  Rectal: deferred  Musculoskeletal: left second finger with marked edema, bogginess over PIP joint with erythema from tip of finger to wrist, tender to touch, eschar over PIP  Neurological: AxOx3, moving all extremities, no focal deficits  Skin: no rashes      Labs:                        12.0   7.3   )-----------( 212      ( 20 Jan 2018 05:50 )             36.4     01-20    144  |  110<H>  |  16  ----------------------------<  93  4.2   |  29  |  0.76    Ca    8.5      20 Jan 2018 05:50  Phos  3.7     01-20  Mg     2.1     01-20    TPro  5.8<L>  /  Alb  3.0<L>  /  TBili  0.7  /  DBili  x   /  AST  17  /  ALT  30  /  AlkPhos  78  01-20           Cultures:       Culture - Blood (collected 01-19-18 @ 21:25)  Source: .Blood None  Preliminary Report (01-21-18 @ 01:03):    No growth to date.    Culture - Blood (collected 01-19-18 @ 21:25)  Source: .Blood None  Preliminary Report (01-21-18 @ 01:03):    No growth to date.                                 Radiology:< from: Xray Finger, Left Hand (01.19.18 @ 21:23) >  IMPRESSION:   No fracture or dislocation. Narrowing at the first carpal   metacarpal joint space.           < end of copied text >      Advanced directive addressed: full resuscitation

## 2018-01-21 NOTE — PROGRESS NOTE ADULT - SUBJECTIVE AND OBJECTIVE BOX
Patient seen and examined. Pain controlled. States fullness in left index finger and hand seems mildly improved, erythema much improved and now localized to dorsal PIP. denies fever/chills.       MEDICATIONS  (STANDING):  sodium chloride 0.9%. 1000 milliLiter(s) IV Continuous <Continuous>  vancomycin  IVPB 1000 milliGRAM(s) IV Intermittent once    Allergies    latex (Unknown)  sulfa drugs (Unknown)    Intolerances                                13.6   10.4  )-----------( 270      ( 19 Jan 2018 21:25 )             41.0     19 Jan 2018 21:25    140    |  105    |  24     ----------------------------<  120    3.8     |  29     |  1.00     Ca    9.5        19 Jan 2018 21:25    TPro  7.6    /  Alb  3.8    /  TBili  0.6    /  DBili  x      /  AST  23     /  ALT  45     /  AlkPhos  104    19 Jan 2018 21:25    PT/INR - ( 19 Jan 2018 21:25 )   PT: 11.3 sec;   INR: 1.05 ratio         PTT - ( 19 Jan 2018 21:25 )  PTT:31.0 sec  Vital Signs Last 24 Hrs  T(C): 36.9 (01-19-18 @ 20:41), Max: 36.9 (01-19-18 @ 20:41)  T(F): 98.4 (01-19-18 @ 20:41), Max: 98.4 (01-19-18 @ 20:41)  HR: 89 (01-19-18 @ 20:41) (89 - 89)  BP: 118/74 (01-19-18 @ 20:41) (118/74 - 118/74)  BP(mean): --  RR: 16 (01-19-18 @ 20:41) (16 - 16)  SpO2: 100% (01-19-18 @ 20:41) (100% - 100%)    Physical Exam  Gen: NAD  LUE: Skin intact, compartments soft, warm well perfused, rdial pulse intact, +r/u/m/ain/pin function, silt, +0.5 cm scab over dorsal index finger PIP joint with swelling/erythema and TTP, AROM with pain of index PIP joint limited, PROM index PIP joint 0-45 with pain at end flexion, +erythema only to DIP now, +small scab with fluid collection over dorsal aspect of small finger PIP joint without erythema/TTP and full painless AROM, full AROM without pain to all other fingers without erythema or signs of infection  R Hand: +small scab over dorsal aspect of small finger pip joint with fluid collection without erythema/TTP, full painless AROM all joint, no signs of infection/cellulitis/phlebitis, NV intact

## 2018-01-21 NOTE — PROGRESS NOTE ADULT - ASSESSMENT
68 yo F with past medical history gastric sleeve and hip surgery in past now admitted on 1/19 for evaluation of left second finger infection; of note the patient cut her finger on a filet knife on 1/6 that was sitting in dishwater; she cleaned the wound with "organic antibiotic-silver colloid" which is made with distilled water. Subsequently a pipe broke in her house and she used towel to clean up the dirty basement water and exposed her finger to this as well. Over time the second PIP joint became swollen and boggy with crusting at the initial skin defect, redness started to develop distally and up her wrist, tracking up her arm prompting visit to [facility], referred to ED. She can not bend the finger, denies any other specific complaint, no fever.  1. Patient admitted with left hand cellulitis, possible septic arthritis versus tenosynovitis, versus osteomyelitis  - follow up cultures   - iv hydration and supportive care   - serial cbc and monitor temperature   - day #2 zosyn and vancomycin  - tolerating antibiotics without rashes or side effects   - strongly encourage I&D of joint, as believe this is a more complicated infection than just a cellulitis  - would send cultures as well as AFB culture as patient may have Mycobacteria marinum or abscessus  - check vancomycin trough prior to fourth dose   - discussed with ortho resident  - probably will need home iv antibiotics  Will follow

## 2018-01-22 LAB
NIGHT BLUE STAIN TISS: SIGNIFICANT CHANGE UP
SPECIMEN SOURCE: SIGNIFICANT CHANGE UP
VANCOMYCIN TROUGH SERPL-MCNC: 11.3 UG/ML — SIGNIFICANT CHANGE UP (ref 10–20)

## 2018-01-22 RX ORDER — DAPTOMYCIN 500 MG/10ML
550 INJECTION, POWDER, LYOPHILIZED, FOR SOLUTION INTRAVENOUS EVERY 24 HOURS
Refills: 0 | Status: DISCONTINUED | OUTPATIENT
Start: 2018-01-22 | End: 2018-01-22

## 2018-01-22 RX ORDER — DAPTOMYCIN 500 MG/10ML
500 INJECTION, POWDER, LYOPHILIZED, FOR SOLUTION INTRAVENOUS EVERY 24 HOURS
Refills: 0 | Status: DISCONTINUED | OUTPATIENT
Start: 2018-01-22 | End: 2018-01-23

## 2018-01-22 RX ADMIN — DAPTOMYCIN 120 MILLIGRAM(S): 500 INJECTION, POWDER, LYOPHILIZED, FOR SOLUTION INTRAVENOUS at 16:46

## 2018-01-22 RX ADMIN — Medication 250 MILLIGRAM(S): at 05:38

## 2018-01-22 RX ADMIN — PIPERACILLIN AND TAZOBACTAM 25 GRAM(S): 4; .5 INJECTION, POWDER, LYOPHILIZED, FOR SOLUTION INTRAVENOUS at 09:32

## 2018-01-22 RX ADMIN — Medication 1 TABLET(S): at 17:38

## 2018-01-22 RX ADMIN — Medication 1 TABLET(S): at 09:38

## 2018-01-22 NOTE — PROGRESS NOTE ADULT - ASSESSMENT
66 yo F with past medical history gastric sleeve and hip surgery in past now admitted on 1/19 for evaluation of left second finger infection; of note the patient cut her finger on a filet knife on 1/6 that was sitting in dishwater; she cleaned the wound with "organic antibiotic-silver colloid" which is made with distilled water. Subsequently a pipe broke in her house and she used towel to clean up the dirty basement water and exposed her finger to this as well. Over time the second PIP joint became swollen and boggy with crusting at the initial skin defect, redness started to develop distally and up her wrist, tracking up her arm prompting visit to [facility], referred to ED. She can not bend the finger, denies any other specific complaint, no fever.  1. Patient admitted with left hand cellulitis, possible septic arthritis versus tenosynovitis, versus osteomyelitis  - follow up cultures; now found to have Staph aureus in culture  - will optimize antibiotics to daptomycin 500 mg daily with weekly cbc, cmp, esr, crp until 2/19  - will order midline  - iv hydration and supportive care   - serial cbc and monitor temperature   - will stop vancomycin and zosyn  - tolerating antibiotics without rashes or side effects   Will follow 66 yo F with past medical history gastric sleeve and hip surgery in past now admitted on 1/19 for evaluation of left second finger infection; of note the patient cut her finger on a filet knife on 1/6 that was sitting in dishwater; she cleaned the wound with "organic antibiotic-silver colloid" which is made with distilled water. Subsequently a pipe broke in her house and she used towel to clean up the dirty basement water and exposed her finger to this as well. Over time the second PIP joint became swollen and boggy with crusting at the initial skin defect, redness started to develop distally and up her wrist, tracking up her arm prompting visit to [facility], referred to ED. She can not bend the finger, denies any other specific complaint, no fever.  1. Patient admitted with left hand cellulitis, possible septic arthritis versus tenosynovitis, versus osteomyelitis  - follow up cultures; now found to have Staph aureus in culture  - will optimize antibiotics to daptomycin 500 mg daily with weekly cbc, cmp, esr, crp,cpk until 2/19  - will order midline  - iv hydration and supportive care   - serial cbc and monitor temperature   - will stop vancomycin and zosyn  - tolerating antibiotics without rashes or side effects   Will follow

## 2018-01-22 NOTE — PROGRESS NOTE ADULT - SUBJECTIVE AND OBJECTIVE BOX
Patient seen and examined. Pain controlled. States pain in left index finger and hand improved after bedside I&D yesterday, erythema improved. denies fever/chills.       Vital Signs Last 24 Hrs  T(C): 36.7 (22 Jan 2018 05:40), Max: 36.7 (22 Jan 2018 05:40)  T(F): 98.1 (22 Jan 2018 05:40), Max: 98.1 (22 Jan 2018 05:40)  HR: 67 (22 Jan 2018 05:40) (67 - 78)  BP: 96/61 (22 Jan 2018 05:40) (96/61 - 117/57)  BP(mean): --  RR: 17 (22 Jan 2018 05:40) (16 - 17)  SpO2: 98% (22 Jan 2018 05:40) (98% - 98%)    Culture - Abscess with Gram Stain (01.21.18 @ 11:30)    Specimen Source: .Abscess Arm - Left    Culture Results:   Few Staphylococcus aureus    Culture - Blood (01.19.18 @ 21:25)    Specimen Source: .Blood None    Culture Results:   No growth to date.    Physical Exam  Gen: NAD  LUE: s/p I&D L index finger PIP, dressing in place, warm well perfused, radial pulse intact, +r/u/m/ain/pin function, silt, AROM with pain of index PIP joint limited, PROM index PIP joint 0-45 with pain at end flexion, full AROM without pain to all other fingers without erythema or signs of infection  R Hand: +small scab over dorsal aspect of small finger pip joint with fluid collection without erythema/TTP, full painless AROM all joint, no signs of infection/cellulitis/phlebitis, NV intact

## 2018-01-22 NOTE — CDI QUERY NOTE - NSCDIOTHERTXTBX_GEN_ALL_CORE_HH
Admission for redness and swelling of left 2nd digit finger and forearm. I&D on 1/21 cultures positive for Staph Aureus.  Midline to be inserted and home on intravenous antibiotics.  Please indicate a diagnosis that might support the above clinical.   A) Possible Septic Arthritis  B) Suspected Osteomyelitis  C) Cellulitis  D) Other ( Please specify condition)

## 2018-01-22 NOTE — PROGRESS NOTE ADULT - ASSESSMENT
A/P: 67y Female with L index finger and forearm cellulitis s/p bedside I&D 1/21/18  Three times a day soaks x 15 min with warm running water, dressing changes with gauze and tape  FU Aspiration cultures/sensitivities - Few S. aureus  Antibiotics per ID  Pain control  WBAT  elevation   Active movement of fingers encouraged  monitor clinically for improvement  will follow

## 2018-01-22 NOTE — PROGRESS NOTE ADULT - SUBJECTIVE AND OBJECTIVE BOX
HPI:  66 yo F with past medical history gastric sleeve and hip surgery in past now admitted on 1/19 for evaluation of left second finger infection; of note the patient cut her finger on a filet knife on 1/6 that was sitting in dishwater; she cleaned the wound with "organic antibiotic-silver colloid" which is made with distilled water. Subsequently a pipe broke in her house and she used towel to clean up the dirty basement water and exposed her finger to this as well. Over time the second PIP joint became swollen and boggy with crusting at the initial skin defect, redness started to develop distally and up her wrist, tracking up her arm prompting visit to [facility], referred to ED. She can not bend the finger, denies any other specific complaint, no fever.  Today 1/21 patient notes the area still to be painful  Today 1/22 patient had drainage of the joint    MEDICATIONS  (STANDING):  DAPTOmycin IVPB 550 milliGRAM(s) IV Intermittent every 24 hours  lactobacillus acidophilus 1 Tablet(s) Oral two times a day with meals  sodium chloride 0.9%. 1000 milliLiter(s) (75 mL/Hr) IV Continuous <Continuous>    MEDICATIONS  (PRN):  docusate sodium 100 milliGRAM(s) Oral three times a day PRN Constipation  oxyCODONE    IR 10 milliGRAM(s) Oral every 6 hours PRN Moderate Pain (4 - 6)      Vital Signs Last 24 Hrs  T(C): 36.7 (22 Jan 2018 05:40), Max: 36.7 (22 Jan 2018 05:40)  T(F): 98.1 (22 Jan 2018 05:40), Max: 98.1 (22 Jan 2018 05:40)  HR: 67 (22 Jan 2018 05:40) (67 - 78)  BP: 96/61 (22 Jan 2018 05:40) (96/61 - 106/57)  BP(mean): --  RR: 17 (22 Jan 2018 05:40) (16 - 17)  SpO2: 98% (22 Jan 2018 05:40) (98% - 98%)    Physical Exam:        Daily   Constitutional: nontoxic appearing  HEENT: NC/AT, EOMI, PERRLA  Neck: supple  Respiratory: clear, no r/r/w  Cardiovascular: S1S2 regular, no murmurs  Abdomen: soft, not tender, not distended, positive BS  Genitourinary: deferred  Rectal: deferred  Musculoskeletal: left second finger with marked edema, incision over joint, still with edema  Neurological: AxOx3, moving all extremities, no focal deficits  Skin: no rashes      Labs:  Labs:             Vancomycin Level, Trough: 11.3 ug/mL (01-22 @ 05:32)      Cultures:       Culture - Abscess with Gram Stain (collected 01-21-18 @ 11:30)  Source: .Abscess Arm - Left  Preliminary Report (01-22-18 @ 07:22):    Few Staphylococcus aureus    Culture - Blood (collected 01-19-18 @ 21:25)  Source: .Blood None  Preliminary Report (01-21-18 @ 01:03):    No growth to date.    Culture - Blood (collected 01-19-18 @ 21:25)  Source: .Blood None  Preliminary Report (01-21-18 @ 01:03):    No growth to date.                                12.0   7.3   )-----------( 212      ( 20 Jan 2018 05:50 )             36.4     01-20    144  |  110<H>  |  16  ----------------------------<  93  4.2   |  29  |  0.76    Ca    8.5      20 Jan 2018 05:50  Phos  3.7     01-20  Mg     2.1     01-20    TPro  5.8<L>  /  Alb  3.0<L>  /  TBili  0.7  /  DBili  x   /  AST  17  /  ALT  30  /  AlkPhos  78  01-20           Cultures:       Culture - Blood (collected 01-19-18 @ 21:25)  Source: .Blood None  Preliminary Report (01-21-18 @ 01:03):    No growth to date.    Culture - Blood (collected 01-19-18 @ 21:25)  Source: .Blood None  Preliminary Report (01-21-18 @ 01:03):    No growth to date.                                 Radiology:< from: Xray Finger, Left Hand (01.19.18 @ 21:23) >  IMPRESSION:   No fracture or dislocation. Narrowing at the first carpal   metacarpal joint space.           < end of copied text >      Advanced directive addressed: full resuscitation

## 2018-01-22 NOTE — PROGRESS NOTE ADULT - SUBJECTIVE AND OBJECTIVE BOX
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 Patient states she cut her finger on 1/6/18. It seemed like it was healing initially, but then she started noticing that it became boggy around her PIP joint and the swelling spread to her DIP joint, to her wrist and started tracking down her arm. Patient was not able to make a fist due to pain. Denied fever / chills / nausea /vomiting / CP / SOB. She was admitted with cellulitis/lymphangitis and to r/o underlying abscess/OM/tenosynovitis.    1/21: pt seen and examined this am. Feeling a little better. No pain at affected site, more stiffness. no diarrhea.   1/22: Less swelling and pain    Review of system- All 10 systems reviewed and is as per HPI otherwise negative.   Vital Signs Last 24 Hrs  T(C): 36.7 (22 Jan 2018 11:59), Max: 36.7 (22 Jan 2018 05:40)  T(F): 98 (22 Jan 2018 11:59), Max: 98.1 (22 Jan 2018 05:40)  HR: 67 (22 Jan 2018 11:59) (67 - 78)  BP: 106/59 (22 Jan 2018 11:59) (96/61 - 106/59)  BP(mean): --  RR: 16 (22 Jan 2018 11:59) (16 - 17)  SpO2: 99% (22 Jan 2018 11:59) (98% - 99%)    PHYSICAL EXAM:    GENERAL: Comfortable, no acute distress  HEAD:  Atraumatic, Normocephalic  EYES: EOMI, PERRLA  HEENT: Moist mucous membranes  NECK: Supple, No JVD  NERVOUS SYSTEM:  Alert & Oriented X3, Motor Strength 5/5 B/L upper and lower extremities  CHEST/LUNG: Clear to auscultation bilaterally  HEART: Regular rate and rhythm; No murmurs, rubs, or gallops  ABDOMEN: Soft, Nontender, Nondistended; Bowel sounds present  GENITOURINARY- Voiding, no palpable bladder  EXTREMITIES:  Left 2nd digit with erythema/swelling over PIP joint, no active drainage,  fading proximal streaking.  MUSCULOSKELETAL- No muscle tenderness,   SKIN-no rash    LABS:                        12.0   7.3   )-----------( 212      ( 20 Jan 2018 05:50 )             36.4     01-20    144  |  110<H>  |  16  ----------------------------<  93  4.2   |  29  |  0.76    Ca    8.5      20 Jan 2018 05:50  Phos  3.7     01-20  Mg     2.1     01-20    TPro  5.8<L>  /  Alb  3.0<L>  /  TBili  0.7  /  DBili  x   /  AST  17  /  ALT  30  /  AlkPhos  78  01-20    PT/INR - ( 19 Jan 2018 21:25 )   PT: 11.3 sec;   INR: 1.05 ratio         PTT - ( 19 Jan 2018 21:25 )  PTT:31.0 sec        MEDS  docusate sodium 100 milliGRAM(s) Oral three times a day PRN  oxyCODONE    IR 10 milliGRAM(s) Oral every 6 hours PRN  piperacillin/tazobactam IVPB. 3.375 Gram(s) IV Intermittent every 8 hours  sodium chloride 0.9%. 1000 milliLiter(s) IV Continuous <Continuous>  vancomycin  IVPB 1000 milliGRAM(s) IV Intermittent every 12 hours    ASSESSMENT AND PLAN:  67F, PMH AS ABOVE A/W:    1. Left 2nd digit cellulitis with possible deep tissue involvement; possible septic arthritis versus tenosynovitis, versus osteomyelitis:  -s/pbedside I&D 1/21/18  -Staph aureus in aspiration culture  -ID eval appreciated - optimize antibiotics to daptomycin 500 mg daily with weekly cbc, cmp, esr, crp,cpk until 2/19  -s/p vancomycin and zosyn  -ortho consult appreciated - Three times a day soaks x 15 min with warm running water, dressing changes with gauze and tape  -blood cx - ngtd  -pain control    2. Dehydration:  -improved with ivf    3. dvt px:  ambulate,

## 2018-01-23 LAB
-  AMPICILLIN/SULBACTAM: SIGNIFICANT CHANGE UP
-  CEFAZOLIN: SIGNIFICANT CHANGE UP
-  CIPROFLOXACIN: SIGNIFICANT CHANGE UP
-  CLINDAMYCIN: SIGNIFICANT CHANGE UP
-  ERYTHROMYCIN: SIGNIFICANT CHANGE UP
-  GENTAMICIN: SIGNIFICANT CHANGE UP
-  LEVOFLOXACIN: SIGNIFICANT CHANGE UP
-  MOXIFLOXACIN(AEROBIC): SIGNIFICANT CHANGE UP
-  OXACILLIN: SIGNIFICANT CHANGE UP
-  PENICILLIN: SIGNIFICANT CHANGE UP
-  RIFAMPIN: SIGNIFICANT CHANGE UP
-  TETRACYCLINE: SIGNIFICANT CHANGE UP
-  TRIMETHOPRIM/SULFAMETHOXAZOLE: SIGNIFICANT CHANGE UP
-  VANCOMYCIN: SIGNIFICANT CHANGE UP
ANION GAP SERPL CALC-SCNC: 9 MMOL/L — SIGNIFICANT CHANGE UP (ref 5–17)
BUN SERPL-MCNC: 17 MG/DL — SIGNIFICANT CHANGE UP (ref 7–23)
CALCIUM SERPL-MCNC: 9 MG/DL — SIGNIFICANT CHANGE UP (ref 8.5–10.1)
CHLORIDE SERPL-SCNC: 107 MMOL/L — SIGNIFICANT CHANGE UP (ref 96–108)
CO2 SERPL-SCNC: 28 MMOL/L — SIGNIFICANT CHANGE UP (ref 22–31)
CREAT SERPL-MCNC: 0.78 MG/DL — SIGNIFICANT CHANGE UP (ref 0.5–1.3)
GLUCOSE SERPL-MCNC: 94 MG/DL — SIGNIFICANT CHANGE UP (ref 70–99)
HCT VFR BLD CALC: 36.6 % — SIGNIFICANT CHANGE UP (ref 34.5–45)
HGB BLD-MCNC: 11.8 G/DL — SIGNIFICANT CHANGE UP (ref 11.5–15.5)
MCHC RBC-ENTMCNC: 28.2 PG — SIGNIFICANT CHANGE UP (ref 27–34)
MCHC RBC-ENTMCNC: 32.2 GM/DL — SIGNIFICANT CHANGE UP (ref 32–36)
MCV RBC AUTO: 87.6 FL — SIGNIFICANT CHANGE UP (ref 80–100)
METHOD TYPE: SIGNIFICANT CHANGE UP
PLATELET # BLD AUTO: 234 K/UL — SIGNIFICANT CHANGE UP (ref 150–400)
POTASSIUM SERPL-MCNC: 3.7 MMOL/L — SIGNIFICANT CHANGE UP (ref 3.5–5.3)
POTASSIUM SERPL-SCNC: 3.7 MMOL/L — SIGNIFICANT CHANGE UP (ref 3.5–5.3)
RBC # BLD: 4.18 M/UL — SIGNIFICANT CHANGE UP (ref 3.8–5.2)
RBC # FLD: 12.5 % — SIGNIFICANT CHANGE UP (ref 10.3–14.5)
SODIUM SERPL-SCNC: 144 MMOL/L — SIGNIFICANT CHANGE UP (ref 135–145)
WBC # BLD: 6.8 K/UL — SIGNIFICANT CHANGE UP (ref 3.8–10.5)
WBC # FLD AUTO: 6.8 K/UL — SIGNIFICANT CHANGE UP (ref 3.8–10.5)

## 2018-01-23 RX ORDER — ACETAMINOPHEN 500 MG
650 TABLET ORAL EVERY 6 HOURS
Refills: 0 | Status: DISCONTINUED | OUTPATIENT
Start: 2018-01-23 | End: 2018-01-24

## 2018-01-23 RX ORDER — CEFTRIAXONE 500 MG/1
INJECTION, POWDER, FOR SOLUTION INTRAMUSCULAR; INTRAVENOUS
Refills: 0 | Status: DISCONTINUED | OUTPATIENT
Start: 2018-01-23 | End: 2018-01-23

## 2018-01-23 RX ORDER — CEFTRIAXONE 500 MG/1
2000 INJECTION, POWDER, FOR SOLUTION INTRAMUSCULAR; INTRAVENOUS ONCE
Refills: 0 | Status: COMPLETED | OUTPATIENT
Start: 2018-01-23 | End: 2018-01-23

## 2018-01-23 RX ORDER — CEFTRIAXONE 500 MG/1
2000 INJECTION, POWDER, FOR SOLUTION INTRAMUSCULAR; INTRAVENOUS EVERY 24 HOURS
Refills: 0 | Status: DISCONTINUED | OUTPATIENT
Start: 2018-01-24 | End: 2018-01-24

## 2018-01-23 RX ORDER — CEFTRIAXONE 500 MG/1
INJECTION, POWDER, FOR SOLUTION INTRAMUSCULAR; INTRAVENOUS
Refills: 0 | Status: DISCONTINUED | OUTPATIENT
Start: 2018-01-23 | End: 2018-01-24

## 2018-01-23 RX ADMIN — Medication 650 MILLIGRAM(S): at 20:27

## 2018-01-23 RX ADMIN — Medication 1 TABLET(S): at 08:44

## 2018-01-23 RX ADMIN — CEFTRIAXONE 2000 MILLIGRAM(S): 500 INJECTION, POWDER, FOR SOLUTION INTRAMUSCULAR; INTRAVENOUS at 18:03

## 2018-01-23 RX ADMIN — Medication 1 TABLET(S): at 18:04

## 2018-01-23 NOTE — PROGRESS NOTE ADULT - SUBJECTIVE AND OBJECTIVE BOX
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 Patient states she cut her finger on 1/6/18. It seemed like it was healing initially, but then she started noticing that it became boggy around her PIP joint and the swelling spread to her DIP joint, to her wrist and started tracking down her arm. Patient was not able to make a fist due to pain. Denied fever / chills / nausea /vomiting / CP / SOB. She was admitted with cellulitis/lymphangitis and to r/o underlying abscess/OM/tenosynovitis.    1/21: pt seen and examined this am. Feeling a little better. No pain at affected site, more stiffness. no diarrhea.   1/22: Less swelling and pain  1/23- OOB to chair- eager to be discharged but awaiting insurance authorization for IV antibiotics and placement of midline catheter. No new complaints.     Review of system- All 10 systems reviewed and is as per HPI otherwise negative.   Vital Signs Last 24 Hrs  T(C): 36.7 (23 Jan 2018 11:30), Max: 36.8 (22 Jan 2018 21:37)  T(F): 98 (23 Jan 2018 11:30), Max: 98.2 (22 Jan 2018 21:37)  HR: 77 (23 Jan 2018 11:30) (70 - 78)  BP: 106/57 (23 Jan 2018 11:30) (105/56 - 115/69)  BP(mean): --  RR: 16 (23 Jan 2018 11:30) (16 - 17)  SpO2: 98% (23 Jan 2018 11:30) (98% - 100%)    PHYSICAL EXAM:    GENERAL: Comfortable, no acute distress  HEAD:  Atraumatic, Normocephalic  EYES: EOMI, PERRLA  HEENT: Moist mucous membranes  NECK: Supple, No JVD  NERVOUS SYSTEM:  Alert & Oriented X3, Motor Strength 5/5 B/L upper and lower extremities  CHEST/LUNG: Clear to auscultation bilaterally  HEART: Regular rate and rhythm; No murmurs, rubs, or gallops  ABDOMEN: Soft, Nontender, Nondistended; Bowel sounds present  GENITOURINARY- Voiding, no palpable bladder  EXTREMITIES: Left 2nd digit redness improved. Able to bend finger  MUSCULOSKELETAL- No muscle tenderness,   SKIN-no rash    LABS:                        12.0   7.3   )-----------( 212      ( 20 Jan 2018 05:50 )             36.4     01-20    144  |  110<H>  |  16  ----------------------------<  93  4.2   |  29  |  0.76    Ca    8.5      20 Jan 2018 05:50  Phos  3.7     01-20  Mg     2.1     01-20    TPro  5.8<L>  /  Alb  3.0<L>  /  TBili  0.7  /  DBili  x   /  AST  17  /  ALT  30  /  AlkPhos  78  01-20    PT/INR - ( 19 Jan 2018 21:25 )   PT: 11.3 sec;   INR: 1.05 ratio         PTT - ( 19 Jan 2018 21:25 )  PTT:31.0 sec        MEDICATIONS  (STANDING):  cefTRIAXone Injectable 2000 milliGRAM(s) IV Push once  cefTRIAXone Injectable      lactobacillus acidophilus 1 Tablet(s) Oral two times a day with meals  sodium chloride 0.9%. 1000 milliLiter(s) (75 mL/Hr) IV Continuous <Continuous>    MEDICATIONS  (PRN):  docusate sodium 100 milliGRAM(s) Oral three times a day PRN Constipation  oxyCODONE    IR 10 milliGRAM(s) Oral every 6 hours PRN Moderate Pain (4 - 6)      ASSESSMENT AND PLAN:  67F, PMH AS ABOVE A/W:    1. Left 2nd digit cellulitis with possible deep tissue involvement; possible septic arthritis versus tenosynovitis, versus osteomyelitis:  -s/p bedside I&D 1/21/18  -Staph aureus in aspiration culture  -ID eval appreciated - optimize antibiotics to daptomycin 500 mg daily with weekly cbc, cmp, esr, crp,cpk until 2/19- as per CM- daptomycin is mot covered by her insurance so this was changed to rocephin as per recommendations of ID  -s/p vancomycin and zosyn  -ortho consult appreciated - Three times a day soaks x 15 min with warm running water, dressing changes with gauze and tape  -blood cx - ngtd  -pain control    2. Dehydration:  -improved with ivf    3. dvt px:  ambulate,    Patient is medically optimized for discharge pending MIdine catheter placement and insurance auth for home infusion with Home care agency. CM aware.  Case d/w team on IDR. Plan was dicussed with patient. 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 Patient states she cut her finger on 1/6/18. It seemed like it was healing initially, but then she started noticing that it became boggy around her PIP joint and the swelling spread to her DIP joint, to her wrist and started tracking down her arm. Patient was not able to make a fist due to pain. Denied fever / chills / nausea /vomiting / CP / SOB. She was admitted with cellulitis/lymphangitis and to r/o underlying abscess/OM/tenosynovitis.    1/21: pt seen and examined this am. Feeling a little better. No pain at affected site, more stiffness. no diarrhea.   1/22: Less swelling and pain  1/23- OOB to chair- eager to be discharged but awaiting insurance authorization for IV antibiotics and placement of midline catheter. No new complaints.     Review of system- All 10 systems reviewed and is as per HPI otherwise negative.   Vital Signs Last 24 Hrs  T(C): 36.7 (23 Jan 2018 11:30), Max: 36.8 (22 Jan 2018 21:37)  T(F): 98 (23 Jan 2018 11:30), Max: 98.2 (22 Jan 2018 21:37)  HR: 77 (23 Jan 2018 11:30) (70 - 78)  BP: 106/57 (23 Jan 2018 11:30) (105/56 - 115/69)  BP(mean): --  RR: 16 (23 Jan 2018 11:30) (16 - 17)  SpO2: 98% (23 Jan 2018 11:30) (98% - 100%)    PHYSICAL EXAM:    GENERAL: Comfortable, no acute distress  HEAD:  Atraumatic, Normocephalic  EYES: EOMI, PERRLA  HEENT: Moist mucous membranes  NECK: Supple, No JVD  NERVOUS SYSTEM:  Alert & Oriented X3, Motor Strength 5/5 B/L upper and lower extremities  CHEST/LUNG: Clear to auscultation bilaterally  HEART: Regular rate and rhythm; No murmurs, rubs, or gallops  ABDOMEN: Soft, Nontender, Nondistended; Bowel sounds present  GENITOURINARY- Voiding, no palpable bladder  EXTREMITIES: Left 2nd digit redness improved. Able to bend finger  MUSCULOSKELETAL- No muscle tenderness,   SKIN-no rash    LABS:                        12.0   7.3   )-----------( 212      ( 20 Jan 2018 05:50 )             36.4     01-20    144  |  110<H>  |  16  ----------------------------<  93  4.2   |  29  |  0.76    Ca    8.5      20 Jan 2018 05:50  Phos  3.7     01-20  Mg     2.1     01-20    TPro  5.8<L>  /  Alb  3.0<L>  /  TBili  0.7  /  DBili  x   /  AST  17  /  ALT  30  /  AlkPhos  78  01-20    PT/INR - ( 19 Jan 2018 21:25 )   PT: 11.3 sec;   INR: 1.05 ratio         PTT - ( 19 Jan 2018 21:25 )  PTT:31.0 sec        MEDICATIONS  (STANDING):  cefTRIAXone Injectable 2000 milliGRAM(s) IV Push once  cefTRIAXone Injectable      lactobacillus acidophilus 1 Tablet(s) Oral two times a day with meals  sodium chloride 0.9%. 1000 milliLiter(s) (75 mL/Hr) IV Continuous <Continuous>    MEDICATIONS  (PRN):  docusate sodium 100 milliGRAM(s) Oral three times a day PRN Constipation  oxyCODONE    IR 10 milliGRAM(s) Oral every 6 hours PRN Moderate Pain (4 - 6)      ASSESSMENT AND PLAN:  67F, PMH AS ABOVE A/W:    1. Left 2nd digit cellulitis with possible deep tissue involvement; possible septic arthritis versus tenosynovitis, versus osteomyelitis:  -s/p bedside I&D 1/21/18  -MSSA Staph aureus in aspiration culture   -ID eval appreciated - optimize antibiotics to daptomycin 500 mg daily with weekly cbc, cmp, esr, crp,cpk until 2/19- as per CM- daptomycin is mot covered by her insurance so this was changed to rocephin 2gm IV QD as per recommendations of ID  -s/p vancomycin and zosyn  -ortho consult appreciated - Three times a day soaks x 15 min with warm running water, dressing changes with gauze and tape  -blood cx - ngtd  -pain control    2. Dehydration:  -improved with ivf    3. dvt px:  ambulate,    Patient is medically optimized for discharge pending MIdine catheter placement and insurance auth for home infusion with Home care agency. CM aware.  Case d/w team on IDR. Plan was dicussed with patient.

## 2018-01-23 NOTE — PROGRESS NOTE ADULT - SUBJECTIVE AND OBJECTIVE BOX
Patient seen and examined. Pain controlled. States pain in left index finger and hand improved after bedside I&D yesterday, erythema improved. denies fever/chills.       All vital signs stable (per nursing flowsheet)      Physical Exam  Gen: NAD  LUE: s/p I&D L index finger PIP, warm well perfused, radial pulse intact, +r/u/m/ain/pin function, silt, AROM with pain of index PIP joint limited, PROM index PIP joint 0-45 with pain at end flexion, full AROM without pain to all other fingers without erythema or signs of infection  R Hand: +small scab over dorsal aspect of small finger pip joint with fluid collection without erythema/TTP, full painless AROM all joint, no signs of infection/cellulitis/phlebitis, NV intact

## 2018-01-23 NOTE — ADVANCED PRACTICE NURSE CONSULT - ASSESSMENT
Pt received alert and oriented x 4, calm and cooperative. Risks and benefits of midline placement explained with verbal understanding. Verbal consent obtained. BARD Powerglide Midline 18g, 10cm inserted in left cephalic vein via ultrasound guidance. Flushes well with 20cc NS with brisk blood return. End cap placed. Minimal blood loss. No chest xray needed. Report given to district RN    LOT # [id number]

## 2018-01-23 NOTE — PROGRESS NOTE ADULT - ASSESSMENT
68 yo F with past medical history gastric sleeve and hip surgery in past now admitted on 1/19 for evaluation of left second finger infection; of note the patient cut her finger on a filet knife on 1/6 that was sitting in dishwater; she cleaned the wound with "organic antibiotic-silver colloid" which is made with distilled water. Subsequently a pipe broke in her house and she used towel to clean up the dirty basement water and exposed her finger to this as well. Over time the second PIP joint became swollen and boggy with crusting at the initial skin defect, redness started to develop distally and up her wrist, tracking up her arm prompting visit to [facility], referred to ED. She can not bend the finger, denies any other specific complaint, no fever.  1. Patient admitted with left hand cellulitis, possible septic arthritis versus tenosynovitis, versus osteomyelitis with MSSA  - follow up cultures; now found to have Staph aureus in culture  - on daptomycin 500 mg daily   -tolerating abx well so far; no side effects noted  -change abx to ceftriaxone 2 gm IV qd  -reason for abx use and side effects reviewed with patient; monitor BMP   - will order midline  - iv hydration and supportive care   - serial cbc and monitor temperature   - tolerating antibiotics without rashes or side effects   Will follow

## 2018-01-23 NOTE — PROGRESS NOTE ADULT - SUBJECTIVE AND OBJECTIVE BOX
HPI:  68 yo F with past medical history gastric sleeve and hip surgery in past now admitted on 1/19 for evaluation of left second finger infection; of note the patient cut her finger on a filet knife on 1/6 that was sitting in dishwater; she cleaned the wound with "organic antibiotic-silver colloid" which is made with distilled water. Subsequently a pipe broke in her house and she used towel to clean up the dirty basement water and exposed her finger to this as well. Over time the second PIP joint became swollen and boggy with crusting at the initial skin defect, redness started to develop distally and up her wrist, tracking up her arm prompting visit to [facility], referred to ED. She can not bend the finger, denies any other specific complaint, no fever.    Comfortable   Hand horace nis improving    MEDICATIONS  (STANDING):  cefTRIAXone   IVPB      lactobacillus acidophilus 1 Tablet(s) Oral two times a day with meals  sodium chloride 0.9%. 1000 milliLiter(s) (75 mL/Hr) IV Continuous <Continuous>    MEDICATIONS  (PRN):  docusate sodium 100 milliGRAM(s) Oral three times a day PRN Constipation  oxyCODONE    IR 10 milliGRAM(s) Oral every 6 hours PRN Moderate Pain (4 - 6)      Vital Signs Last 24 Hrs  T(C): 36.7 (23 Jan 2018 11:30), Max: 36.8 (22 Jan 2018 21:37)  T(F): 98 (23 Jan 2018 11:30), Max: 98.2 (22 Jan 2018 21:37)  HR: 77 (23 Jan 2018 11:30) (67 - 78)  BP: 106/57 (23 Jan 2018 11:30) (105/56 - 115/69)  BP(mean): --  RR: 16 (23 Jan 2018 11:30) (16 - 17)  SpO2: 98% (23 Jan 2018 11:30) (98% - 100%)    Physical Exam:        Daily   Constitutional: nontoxic appearing  HEENT: NC/AT, EOMI, PERRLA  Neck: supple  Respiratory: clear, no r/r/w  Cardiovascular: S1S2 regular, no murmurs  Abdomen: soft, not tender, not distended, positive BS  Genitourinary: deferred  Rectal: deferred  Musculoskeletal: left second finger with marked edema, incision over joint, still with edema  Neurological: AxOx3, moving all extremities, no focal deficits  Skin: no rashes      Labs:               12.0   7.3   )-----------( 212      ( 20 Jan 2018 05:50 )             36.4     01-20    144  |  110<H>  |  16  ----------------------------<  93  4.2   |  29  |  0.76    Ca    8.5      20 Jan 2018 05:50  Phos  3.7     01-20  Mg     2.1     01-20    TPro  5.8<L>  /  Alb  3.0<L>  /  TBili  0.7  /  DBili  x   /  AST  17  /  ALT  30  /  AlkPhos  78  01-20           Cultures:       Culture - Acid Fast - Other w/Smear (collected 21 Jan 2018 11:30)  Source: .Other Other, left index finger abscess    Culture - Abscess with Gram Stain (collected 21 Jan 2018 11:30)  Source: .Abscess Arm - Left  Preliminary Report (23 Jan 2018 08:43):    Few Staphylococcus aureus    ***********Note************    This isolate demonstrates inducible    clindamycin resistance.    Clindamycin may still be effective in some patients.  Organism: Staphylococcus aureus (23 Jan 2018 08:43)  Organism: Staphylococcus aureus (23 Jan 2018 08:43)      -  Ampicillin/Sulbactam: S <=8/4      -  Cefazolin: S <=4      -  Ciprofloxacin: S <=1      -  Clindamycin: R 0.5      -  Erythromycin: R >4      -  Gentamicin: S 2      -  Levofloxacin: S <=0.5      -  Moxifloxacin(Aerobic): S <=0.5      -  Oxacillin: S <=0.25      -  Penicillin: R >8      -  RIF- Rifampin: S <=1      -  Tetra/Doxy: S <=1      -  Trimethoprim/Sulfamethoxazole: S <=0.5/9.5      -  Vancomycin: S 2      Method Type: CALOS    Culture - Blood (collected 19 Jan 2018 21:25)  Source: .Blood None  Preliminary Report (21 Jan 2018 01:03):    No growth to date.    Culture - Blood (collected 19 Jan 2018 21:25)  Source: .Blood None  Preliminary Report (21 Jan 2018 01:03):    No growth to date.          Radiology:< from: Xray Finger, Left Hand (01.19.18 @ 21:23) >  IMPRESSION:   No fracture or dislocation. Narrowing at the first carpal   metacarpal joint space.           < end of copied text >      Advanced directive addressed: full resuscitation

## 2018-01-24 ENCOUNTER — TRANSCRIPTION ENCOUNTER (OUTPATIENT)
Age: 68
End: 2018-01-24

## 2018-01-24 VITALS
TEMPERATURE: 98 F | DIASTOLIC BLOOD PRESSURE: 94 MMHG | OXYGEN SATURATION: 99 % | RESPIRATION RATE: 16 BRPM | SYSTOLIC BLOOD PRESSURE: 109 MMHG | HEART RATE: 75 BPM

## 2018-01-24 RX ORDER — CEFTRIAXONE 500 MG/1
2 INJECTION, POWDER, FOR SOLUTION INTRAMUSCULAR; INTRAVENOUS
Qty: 0 | Refills: 0 | DISCHARGE
Start: 2018-01-24

## 2018-01-24 RX ORDER — LACTOBACILLUS ACIDOPHILUS 100MM CELL
1 CAPSULE ORAL
Qty: 0 | Refills: 0 | DISCHARGE
Start: 2018-01-24

## 2018-01-24 RX ORDER — DOCUSATE SODIUM 100 MG
1 CAPSULE ORAL
Qty: 0 | Refills: 0 | DISCHARGE
Start: 2018-01-24

## 2018-01-24 RX ADMIN — Medication 1 TABLET(S): at 09:06

## 2018-01-24 RX ADMIN — Medication 1 TABLET(S): at 17:17

## 2018-01-24 RX ADMIN — CEFTRIAXONE 2000 MILLIGRAM(S): 500 INJECTION, POWDER, FOR SOLUTION INTRAMUSCULAR; INTRAVENOUS at 17:17

## 2018-01-24 NOTE — PROGRESS NOTE ADULT - ATTENDING COMMENTS
Patient was seen and examined by me at bedside with the NP, Yareli Sweet.  Plan of care was reviewed and Agree with the assessment and plan.
Patient was seen and examined by me at bedside with the NP, Yareli Sweet.  Plan of care was reviewed and Agree with the assessment and plan.

## 2018-01-24 NOTE — PROGRESS NOTE ADULT - SUBJECTIVE AND OBJECTIVE BOX
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 Patient states she cut her finger on 1/6/18. It seemed like it was healing initially, but then she started noticing that it became boggy around her PIP joint and the swelling spread to her DIP joint, to her wrist and started tracking down her arm. Patient was not able to make a fist due to pain. Denied fever / chills / nausea /vomiting / CP / SOB. She was admitted with cellulitis/lymphangitis and to r/o underlying abscess/OM/tenosynovitis.    1/21: pt seen and examined this am. Feeling a little better. No pain at affected site, more stiffness. no diarrhea.   1/22: Less swelling and pain  1/23- OOB to chair- eager to be discharged but awaiting insurance authorization for IV antibiotics and placement of midline catheter. No new complaints.   1/24    Review of system- All 10 systems reviewed and is as per HPI otherwise negative.     Vital Signs Last 24 Hrs  T(C): 36.5 (24 Jan 2018 06:00), Max: 36.7 (23 Jan 2018 11:30)  T(F): 97.7 (24 Jan 2018 06:00), Max: 98 (23 Jan 2018 11:30)  HR: 69 (24 Jan 2018 06:00) (69 - 79)  BP: 94/50 (24 Jan 2018 06:00) (94/50 - 106/57)  BP(mean): --  RR: 18 (24 Jan 2018 06:00) (16 - 18)  SpO2: 100% (24 Jan 2018 06:00) (98% - 100%)    PHYSICAL EXAM:    GENERAL: Comfortable, no acute distress  HEAD:  Atraumatic, Normocephalic  EYES: EOMI, PERRLA  HEENT: Moist mucous membranes  NECK: Supple, No JVD  NERVOUS SYSTEM:  Alert & Oriented X3, Motor Strength 5/5 B/L upper and lower extremities  CHEST/LUNG: Clear to auscultation bilaterally  HEART: Regular rate and rhythm; No murmurs, rubs, or gallops  ABDOMEN: Soft, Nontender, Nondistended; Bowel sounds present  GENITOURINARY- Voiding, no palpable bladder  EXTREMITIES: Left 2nd digit redness improved. Able to bend finger  MUSCULOSKELETAL- No muscle tenderness,   SKIN-no rash    LABS:    01-23    144  |  107  |  17  ----------------------------<  94  3.7   |  28  |  0.78    Ca    9.0      23 Jan 2018 06:04                            11.8   6.8   )-----------( 234      ( 23 Jan 2018 06:04 )             36.6                           12.0   7.3   )-----------( 212      ( 20 Jan 2018 05:50 )             36.4     01-20    144  |  110<H>  |  16  ----------------------------<  93  4.2   |  29  |  0.76    Ca    8.5      20 Jan 2018 05:50  Phos  3.7     01-20  Mg     2.1     01-20    TPro  5.8<L>  /  Alb  3.0<L>  /  TBili  0.7  /  DBili  x   /  AST  17  /  ALT  30  /  AlkPhos  78  01-20    PT/INR - ( 19 Jan 2018 21:25 )   PT: 11.3 sec;   INR: 1.05 ratio         PTT - ( 19 Jan 2018 21:25 )  PTT:31.0 sec        MEDICATIONS  (STANDING):  cefTRIAXone Injectable 2000 milliGRAM(s) IV Push once  cefTRIAXone Injectable      lactobacillus acidophilus 1 Tablet(s) Oral two times a day with meals  sodium chloride 0.9%. 1000 milliLiter(s) (75 mL/Hr) IV Continuous <Continuous>    MEDICATIONS  (PRN):  docusate sodium 100 milliGRAM(s) Oral three times a day PRN Constipation  oxyCODONE    IR 10 milliGRAM(s) Oral every 6 hours PRN Moderate Pain (4 - 6)      ASSESSMENT AND PLAN:  67F, PMH AS ABOVE A/W:    1. Left 2nd digit cellulitis with possible deep tissue involvement; possible septic arthritis versus tenosynovitis, versus osteomyelitis:  -s/p bedside I&D 1/21/18  -MSSA Staph aureus in aspiration culture   -ID eval appreciated - optimize antibiotics to daptomycin 500 mg daily with weekly cbc, cmp, esr, crp,cpk until 2/19- as per - daptomycin is mot covered by her insurance so this was changed to rocephin 2gm IV QD as per recommendations of ID  -s/p vancomycin and zosyn  -ortho consult appreciated - Three times a day soaks x 15 min with warm running water, dressing changes with gauze and tape- F/U with Ortho as an outpatient  -blood cx - ngtd  -pain control    2. Dehydration:  -improved with ivf    3. dvt px:  ambulate,    Patient is medically optimized for discharge pending MIdine catheter placement and insurance auth for home infusion with Home care agency. CM aware.  Case d/w team on IDR. Plan was dicussed with patient. 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 Patient states she cut her finger on 1/6/18. It seemed like it was healing initially, but then she started noticing that it became boggy around her PIP joint and the swelling spread to her DIP joint, to her wrist and started tracking down her arm. Patient was not able to make a fist due to pain. Denied fever / chills / nausea /vomiting / CP / SOB. She was admitted with cellulitis/lymphangitis and to r/o underlying abscess/OM/tenosynovitis.    1/21: pt seen and examined this am. Feeling a little better. No pain at affected site, more stiffness. no diarrhea.   1/22: Less swelling and pain  1/23- OOB to chair- eager to be discharged but awaiting insurance authorization for IV antibiotics and placement of midline catheter. No new complaints.   1/24 - patient has no complaints wants to go home.     Review of system- All 10 systems reviewed and is as per HPI otherwise negative.     Vital Signs Last 24 Hrs  T(C): 36.5 (24 Jan 2018 06:00), Max: 36.7 (23 Jan 2018 11:30)  T(F): 97.7 (24 Jan 2018 06:00), Max: 98 (23 Jan 2018 11:30)  HR: 69 (24 Jan 2018 06:00) (69 - 79)  BP: 94/50 (24 Jan 2018 06:00) (94/50 - 106/57)  BP(mean): --  RR: 18 (24 Jan 2018 06:00) (16 - 18)  SpO2: 100% (24 Jan 2018 06:00) (98% - 100%)    PHYSICAL EXAM:    GENERAL: Comfortable, no acute distress  HEAD:  Atraumatic, Normocephalic  EYES: EOMI, PERRLA  HEENT: Moist mucous membranes  NECK: Supple, No JVD  NERVOUS SYSTEM:  Alert & Oriented X3, Motor Strength 5/5 B/L upper and lower extremities  CHEST/LUNG: Clear to auscultation bilaterally  HEART: Regular rate and rhythm; No murmurs, rubs, or gallops  ABDOMEN: Soft, Nontender, Nondistended; Bowel sounds present  GENITOURINARY- Voiding, no palpable bladder  EXTREMITIES: Left 2nd digit redness improved. Able to bend finger  MUSCULOSKELETAL- No muscle tenderness,   SKIN-no rash    LABS:    01-23    144  |  107  |  17  ----------------------------<  94  3.7   |  28  |  0.78    Ca    9.0      23 Jan 2018 06:04                            11.8   6.8   )-----------( 234      ( 23 Jan 2018 06:04 )             36.6                           12.0   7.3   )-----------( 212      ( 20 Jan 2018 05:50 )             36.4     01-20    144  |  110<H>  |  16  ----------------------------<  93  4.2   |  29  |  0.76    Ca    8.5      20 Jan 2018 05:50  Phos  3.7     01-20  Mg     2.1     01-20    TPro  5.8<L>  /  Alb  3.0<L>  /  TBili  0.7  /  DBili  x   /  AST  17  /  ALT  30  /  AlkPhos  78  01-20    PT/INR - ( 19 Jan 2018 21:25 )   PT: 11.3 sec;   INR: 1.05 ratio         PTT - ( 19 Jan 2018 21:25 )  PTT:31.0 sec        MEDICATIONS  (STANDING):  cefTRIAXone Injectable 2000 milliGRAM(s) IV Push once  cefTRIAXone Injectable      lactobacillus acidophilus 1 Tablet(s) Oral two times a day with meals  sodium chloride 0.9%. 1000 milliLiter(s) (75 mL/Hr) IV Continuous <Continuous>    MEDICATIONS  (PRN):  docusate sodium 100 milliGRAM(s) Oral three times a day PRN Constipation  oxyCODONE    IR 10 milliGRAM(s) Oral every 6 hours PRN Moderate Pain (4 - 6)      ASSESSMENT AND PLAN:  67F, PMH AS ABOVE A/W:    1. Left 2nd digit cellulitis with possible deep tissue involvement; possible septic arthritis versus tenosynovitis, versus osteomyelitis:  -s/p bedside I&D 1/21/18  -MSSA Staph aureus in aspiration culture   -ID eval appreciated - optimize antibiotics to daptomycin 500 mg daily with weekly cbc, cmp, esr, crp,cpk until 2/19- as per CM- daptomycin is mot covered by her insurance so this was changed to rocephin 2gm IV QD as per recommendations of ID  -s/p vancomycin and zosyn  -ortho consult appreciated - Three times a day soaks x 15 min with warm running water, dressing changes with gauze and tape- F/U with Ortho as an outpatient  -blood cx - ngtd  -pain control    2. Dehydration:  -improved with ivf    3. dvt px:  ambulate,    Patient is medically optimized for discharge pending MIdine catheter placement and insurance auth for home infusion with Home care agency. CM aware.  Case d/w team on IDR. Plan was dicussed with patient.     Total time: 45 mintues

## 2018-01-24 NOTE — DISCHARGE NOTE ADULT - INSTRUCTIONS
regular diet Follow up with MD in 1-2 weeks. Keep midline dressing clean and dry wrap with wrap and keep covered before showering. Monitor for signs of redness or swelling. Return to hospital if you develop increased redness or swelling to finger, fever >101 or chills.

## 2018-01-24 NOTE — PROGRESS NOTE ADULT - PROVIDER SPECIALTY LIST ADULT
Hospitalist
Infectious Disease
Orthopedics

## 2018-01-24 NOTE — DISCHARGE NOTE ADULT - MEDICATION SUMMARY - MEDICATIONS TO TAKE
I will START or STAY ON the medications listed below when I get home from the hospital:    cefTRIAXone  -- 2 gram(s) intravenous once a day  -- Indication: For CELLULITIS OF FINGER LEFT HAND    docusate sodium 100 mg oral capsule  -- 1 cap(s) by mouth 3 times a day, As needed, Constipation  -- Indication: For Constipation    lactobacillus acidophilus oral capsule  -- 1 cap(s) by mouth 2 times a day  -- Indication: For Probiotic- over the counter while on antibiotics.

## 2018-01-24 NOTE — DISCHARGE NOTE ADULT - NSFTFHOMEOTHERFT_GEN_ALL_CORE
jame will need teaching on Medication administration patietn will need teaching on Medication administration- Midline in place- at risk for infection and dislodgement of catheter.

## 2018-01-24 NOTE — DISCHARGE NOTE ADULT - PROVIDER TOKENS
TOKEN:'[id number]',FREE:[LAST:[establish care with a PCP in the communityr],PHONE:[(   )    -],FAX:[(   )    -]],TOKEN:'[id number]'

## 2018-01-24 NOTE — DISCHARGE NOTE ADULT - CARE PROVIDER_API CALL
Kashmir White), Orthopaedic Surgery; Surgery of the Hand  [address] [place] [address]  Phone: [phone]  Fax: [phone]    establish care with a PCP in the communityr,   Phone: (   )    -  Fax: (   )    -    Tommy Blackman), Infectious Disease; Internal Medicine  [address] [facility]  [address]  [address] [place] [address]  Phone: [phone]  Fax: [phone]

## 2018-01-24 NOTE — DISCHARGE NOTE ADULT - HOSPITAL COURSE
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 Patient was admitted with cellulitis of the 2nd finger s/p bedside I/D which patietn tolerated well. Patietn was started on IV antibiotics. No S/E noted. Culture results showed MSSA. Midline was placed for long term antibiotic therapy. Plan is to continue antibiotic until 2/19 with weekly labs. F/U with Dr White and Dr Blackman as an outpatient and establish care with a PCP in the community.   VSS.

## 2018-01-24 NOTE — PROGRESS NOTE ADULT - ASSESSMENT
66 yo F with past medical history gastric sleeve and hip surgery in past now admitted on 1/19 for evaluation of left second finger infection; of note the patient cut her finger on a filet knife on 1/6 that was sitting in dishwater; she cleaned the wound with "organic antibiotic-silver colloid" which is made with distilled water. Subsequently a pipe broke in her house and she used towel to clean up the dirty basement water and exposed her finger to this as well. Over time the second PIP joint became swollen and boggy with crusting at the initial skin defect, redness started to develop distally and up her wrist, tracking up her arm prompting visit to [facility], referred to ED. She can not bend the finger, denies any other specific complaint, no fever.  1. Patient admitted with left hand cellulitis, possible septic arthritis versus tenosynovitis, versus osteomyelitis with MSSA  - follow up cultures; now found to have Staph aureus in culture  -tolerating abx well so far; no side effects noted  -change abx to ceftriaxone 2 gm IV qd  -reason for abx use and side effects reviewed with patient; monitor BMP   - midline in place; will continue antibiotics until 2/19 with weekly cbc, cmp, esr, crp  - iv hydration and supportive care   - serial cbc and monitor temperature   - tolerating antibiotics without rashes or side effects   Will follow

## 2018-01-24 NOTE — PROGRESS NOTE ADULT - SUBJECTIVE AND OBJECTIVE BOX
HPI:  66 yo F with past medical history gastric sleeve and hip surgery in past now admitted on 1/19 for evaluation of left second finger infection; of note the patient cut her finger on a filet knife on 1/6 that was sitting in dishwater; she cleaned the wound with "organic antibiotic-silver colloid" which is made with distilled water. Subsequently a pipe broke in her house and she used towel to clean up the dirty basement water and exposed her finger to this as well. Over time the second PIP joint became swollen and boggy with crusting at the initial skin defect, redness started to develop distally and up her wrist, tracking up her arm prompting [facility], referred to ED. She can not bend the finger, denies any other specific complaint, no fever.    Comfortable   No complaints    MEDICATIONS  (STANDING):  cefTRIAXone Injectable 2000 milliGRAM(s) IV Push every 24 hours  cefTRIAXone Injectable      lactobacillus acidophilus 1 Tablet(s) Oral two times a day with meals  sodium chloride 0.9%. 1000 milliLiter(s) (75 mL/Hr) IV Continuous <Continuous>    MEDICATIONS  (PRN):  acetaminophen   Tablet 650 milliGRAM(s) Oral every 6 hours PRN pain and fever  docusate sodium 100 milliGRAM(s) Oral three times a day PRN Constipation  oxyCODONE    IR 10 milliGRAM(s) Oral every 6 hours PRN Moderate Pain (4 - 6)      Vital Signs Last 24 Hrs  T(C): 36.7 (24 Jan 2018 11:39), Max: 36.7 (24 Jan 2018 11:39)  T(F): 98 (24 Jan 2018 11:39), Max: 98 (24 Jan 2018 11:39)  HR: 75 (24 Jan 2018 11:39) (69 - 79)  BP: 109/94 (24 Jan 2018 11:39) (94/50 - 109/94)  BP(mean): --  RR: 16 (24 Jan 2018 11:39) (16 - 18)  SpO2: 99% (24 Jan 2018 11:39) (99% - 100%)    Physical Exam:          Daily   Constitutional: nontoxic appearing  HEENT: NC/AT, EOMI, PERRLA  Neck: supple  Respiratory: clear, no r/r/w  Cardiovascular: S1S2 regular, no murmurs  Abdomen: soft, not tender, not distended, positive BS  Genitourinary: deferred  Rectal: deferred  Musculoskeletal: left second finger with marked edema, incision over joint, still with edema  Neurological: AxOx3, moving all extremities, no focal deficits  Skin: no rashes      Labs:               12.0   7.3   )-----------( 212      ( 20 Jan 2018 05:50 )             36.4     01-20    144  |  110<H>  |  16  ----------------------------<  93  4.2   |  29  |  0.76    Ca    8.5      20 Jan 2018 05:50  Phos  3.7     01-20  Mg     2.1     01-20    TPro  5.8<L>  /  Alb  3.0<L>  /  TBili  0.7  /  DBili  x   /  AST  17  /  ALT  30  /  AlkPhos  78  01-20           Cultures:       Culture - Acid Fast - Other w/Smear (collected 21 Jan 2018 11:30)  Source: .Other Other, left index finger abscess    Culture - Abscess with Gram Stain (collected 21 Jan 2018 11:30)  Source: .Abscess Arm - Left  Preliminary Report (23 Jan 2018 08:43):    Few Staphylococcus aureus    ***********Note************    This isolate demonstrates inducible    clindamycin resistance.    Clindamycin may still be effective in some patients.  Organism: Staphylococcus aureus (23 Jan 2018 08:43)  Organism: Staphylococcus aureus (23 Jan 2018 08:43)      -  Ampicillin/Sulbactam: S <=8/4      -  Cefazolin: S <=4      -  Ciprofloxacin: S <=1      -  Clindamycin: R 0.5      -  Erythromycin: R >4      -  Gentamicin: S 2      -  Levofloxacin: S <=0.5      -  Moxifloxacin(Aerobic): S <=0.5      -  Oxacillin: S <=0.25      -  Penicillin: R >8      -  RIF- Rifampin: S <=1      -  Tetra/Doxy: S <=1      -  Trimethoprim/Sulfamethoxazole: S <=0.5/9.5      -  Vancomycin: S 2      Method Type: CALOS    Culture - Blood (collected 19 Jan 2018 21:25)  Source: .Blood None  Preliminary Report (21 Jan 2018 01:03):    No growth to date.    Culture - Blood (collected 19 Jan 2018 21:25)  Source: .Blood None  Preliminary Report (21 Jan 2018 01:03):    No growth to date.          Radiology:< from: Xray Finger, Left Hand (01.19.18 @ 21:23) >  IMPRESSION:   No fracture or dislocation. Narrowing at the first carpal   metacarpal joint space.           < end of copied text >      Advanced directive addressed: full resuscitation

## 2018-01-24 NOTE — DISCHARGE NOTE ADULT - PATIENT PORTAL LINK FT
“You can access the Follow[employer] Patient Portal, offered by [facility], by registering with the following website: [email or web address]”

## 2018-01-24 NOTE — DISCHARGE NOTE ADULT - PLAN OF CARE
resolution of infection contienu IV antibiotic as prescribed. Please do not skip or miss doses. Notify your PCP for worsening symptoms (fever, chills, Increasing redness or sob)  F/U with Dr White next week  continue warm soaks to finger.  continue probiotic while you are on antibiotics.   weekly CBC, CMP, ESR and CRP while on antibiotics. contienu IV antibiotic as prescribed until 2/19. Please do not skip or miss doses. Notify your PCP for worsening symptoms (fever, chills, Increasing redness or sob)  F/U with Dr White next week  continue warm soaks to finger.  continue probiotic while you are on antibiotics.   weekly CBC, CMP, ESR and CRP while on antibiotics.

## 2018-01-24 NOTE — PROGRESS NOTE ADULT - ASSESSMENT
A/P: 67y Female with L index finger and forearm cellulitis s/p bedside I&D 1/21/18    Three times a day soaks x 15 min with warm running water, dressing changes with gauze and tape  FU Aspiration cultures/sensitivities - Few S. aureus  Antibiotics per ID  Pain control  WBAT  elevation   Active movement of fingers encouraged  Ortho stable for discharge

## 2018-01-24 NOTE — DISCHARGE NOTE ADULT - CARE PLAN
Principal Discharge DX:	Cellulitis of finger of left hand  Goal:	resolution of infection  Assessment and plan of treatment:	contienu IV antibiotic as prescribed. Please do not skip or miss doses. Notify your PCP for worsening symptoms (fever, chills, Increasing redness or sob)  F/U with Dr White next week  continue warm soaks to finger.  continue probiotic while you are on antibiotics.   weekly CBC, CMP, ESR and CRP while on antibiotics. Principal Discharge DX:	Cellulitis of finger of left hand  Goal:	resolution of infection  Assessment and plan of treatment:	contienu IV antibiotic as prescribed until 2/19. Please do not skip or miss doses. Notify your PCP for worsening symptoms (fever, chills, Increasing redness or sob)  F/U with Dr White next week  continue warm soaks to finger.  continue probiotic while you are on antibiotics.   weekly CBC, CMP, ESR and CRP while on antibiotics.

## 2018-01-25 LAB
CULTURE RESULTS: SIGNIFICANT CHANGE UP
CULTURE RESULTS: SIGNIFICANT CHANGE UP
SPECIMEN SOURCE: SIGNIFICANT CHANGE UP
SPECIMEN SOURCE: SIGNIFICANT CHANGE UP

## 2018-01-26 LAB
CULTURE RESULTS: SIGNIFICANT CHANGE UP
ORGANISM # SPEC MICROSCOPIC CNT: SIGNIFICANT CHANGE UP
ORGANISM # SPEC MICROSCOPIC CNT: SIGNIFICANT CHANGE UP
SPECIMEN SOURCE: SIGNIFICANT CHANGE UP

## 2018-01-30 ENCOUNTER — EMERGENCY (EMERGENCY)
Facility: HOSPITAL | Age: 68
LOS: 0 days | Discharge: HOME | End: 2018-01-30
Attending: EMERGENCY MEDICINE | Admitting: EMERGENCY MEDICINE
Payer: MEDICARE

## 2018-01-30 VITALS
OXYGEN SATURATION: 100 % | HEART RATE: 66 BPM | DIASTOLIC BLOOD PRESSURE: 74 MMHG | SYSTOLIC BLOOD PRESSURE: 101 MMHG | TEMPERATURE: 98 F

## 2018-01-30 VITALS — HEIGHT: 67 IN | WEIGHT: 194.01 LBS

## 2018-01-30 DIAGNOSIS — B95.61 METHICILLIN SUSCEPTIBLE STAPHYLOCOCCUS AUREUS INFECTION AS THE CAUSE OF DISEASES CLASSIFIED ELSEWHERE: ICD-10-CM

## 2018-01-30 DIAGNOSIS — L03.114 CELLULITIS OF LEFT UPPER LIMB: ICD-10-CM

## 2018-01-30 DIAGNOSIS — M79.89 OTHER SPECIFIED SOFT TISSUE DISORDERS: ICD-10-CM

## 2018-01-30 DIAGNOSIS — I10 ESSENTIAL (PRIMARY) HYPERTENSION: ICD-10-CM

## 2018-01-30 DIAGNOSIS — Z98.84 BARIATRIC SURGERY STATUS: ICD-10-CM

## 2018-01-30 DIAGNOSIS — Z79.2 LONG TERM (CURRENT) USE OF ANTIBIOTICS: ICD-10-CM

## 2018-01-30 DIAGNOSIS — E86.0 DEHYDRATION: ICD-10-CM
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
## 2018-01-30 DIAGNOSIS — L02.512 CUTANEOUS ABSCESS OF LEFT HAND: ICD-10-CM

## 2018-01-30 DIAGNOSIS — M79.645 PAIN IN LEFT FINGER(S): ICD-10-CM

## 2018-01-30 DIAGNOSIS — T82.7XXA INFECTION AND INFLAMMATORY REACTION DUE TO OTHER CARDIAC AND VASCULAR DEVICES, IMPLANTS AND GRAFTS, INITIAL ENCOUNTER: ICD-10-CM

## 2018-01-30 DIAGNOSIS — L03.012 CELLULITIS OF LEFT FINGER: ICD-10-CM
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
## 2018-01-30 DIAGNOSIS — Z87.891 PERSONAL HISTORY OF NICOTINE DEPENDENCE: ICD-10-CM
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
## 2018-01-30 PROCEDURE — 99285 EMERGENCY DEPT VISIT HI MDM: CPT

## 2018-01-30 NOTE — ADVANCED PRACTICE NURSE CONSULT - ASSESSMENT
Received pt awake and alert, orientated X3 in ED accompanied by  and daughter. Pt c/o pain in left cephalic midline when flushed, redness and warm to touch 10cm from insertion site. Flushes well with 10ml normal saline, no blood return. Large ecchymotic area around insertion site. Midline catheter removed, gauze applied to area. Risks and benefits of midline catheter explained, verbal consent received. BARD powerglide, 18g,10cm, lot # [id number] placed in right cephalic vein under ultrasound guidance. Brisk blood return, flushes well with 20 ml normal saline. OK to use.

## 2018-01-30 NOTE — ED ADULT NURSE NOTE - CHIEF COMPLAINT QUOTE
pt states mid line is not functioning not flushing, no blood return. left cephalic, w/ hematoma to area.

## 2018-01-30 NOTE — ED STATDOCS - PROGRESS NOTE DETAILS
MIRZA Mortensen:   Patient has been seen, evaluated and orders have been written by the attending in intake. Patient is stable.  I will follow up the results of orders written and I will continue to evaluate/observe the patient.  Pt. with Mid line, requiring change by IV Team.  Pt. taken to Peds Room 4 for procedure.  Radha Mortensen PA-C Mid line changed by IV Team without issues.  Will d/c home to continue outpt. Rocephin for Lymphangitis.  D/C home.  Radha Mortensen PA-C

## 2018-01-30 NOTE — ED STATDOCS - OBJECTIVE STATEMENT
68 y/o F with PMHx of sleep apnea presents to the ED c/o redness to area of mid line. Pt comes to the ED stating that mid line is not flushing and states some redness around the area. Pt had mid line placed on Thursday, 6 days ago. Pt receives Rocephin with mid line. Pt is receiving Rocephin after she cut her finger a week ago and a  burst in her house and Pt developed lymphangitis after cleaning the  burst up. Pt developed cellulitis and lymphangitis. Pt was admitted to  and was admitted for 6 days. Allergic to Latex and sulfa drugs. Pt is right hand dominant.

## 2018-01-30 NOTE — ED ADULT NURSE NOTE - OBJECTIVE STATEMENT
left arm midline IV ecchymotic since insertion.  Today midline had some swelling above the line and it is warm to touch.  Patient reports that the site is painful to touch. Patient reports pain at the site constant.

## 2018-01-30 NOTE — ED STATDOCS - SKIN, MLM
Left arm mild swelling and ecchymosis superior to antecubital fossa with area of erythema superior to area of ecchymosis.

## 2018-03-14 LAB
CULTURE RESULTS: SIGNIFICANT CHANGE UP
SPECIMEN SOURCE: SIGNIFICANT CHANGE UP

## 2018-07-07 NOTE — ED ADULT TRIAGE NOTE - CHIEF COMPLAINT QUOTE
pt states mid line is not functioning not flushing, no blood return. left cephalic, w/ hematoma to area.
unsure

## 2019-05-06 NOTE — H&P ADULT - EYES
EOMI; PERRL; no drainage or redness Complex Repair And V-Y Plasty Text: The defect edges were debeveled with a #15 scalpel blade.  The primary defect was closed partially with a complex linear closure.  Given the location of the remaining defect, shape of the defect and the proximity to free margins a V-Y plasty was deemed most appropriate for complete closure of the defect.  Using a sterile surgical marker, an appropriate advancement flap was drawn incorporating the defect and placing the expected incisions within the relaxed skin tension lines where possible.    The area thus outlined was incised deep to adipose tissue with a #15 scalpel blade.  The skin margins were undermined to an appropriate distance in all directions utilizing iris scissors.

## 2021-07-31 ENCOUNTER — EMERGENCY (EMERGENCY)
Facility: HOSPITAL | Age: 71
LOS: 0 days | Discharge: HOME | End: 2021-07-31
Attending: EMERGENCY MEDICINE
Payer: MEDICARE

## 2021-07-31 VITALS
RESPIRATION RATE: 17 BRPM | TEMPERATURE: 98 F | DIASTOLIC BLOOD PRESSURE: 63 MMHG | HEART RATE: 59 BPM | SYSTOLIC BLOOD PRESSURE: 108 MMHG | OXYGEN SATURATION: 100 %

## 2021-07-31 VITALS — WEIGHT: 199.96 LBS | HEIGHT: 67 IN

## 2021-07-31 DIAGNOSIS — M79.631 PAIN IN RIGHT FOREARM: ICD-10-CM

## 2021-07-31 DIAGNOSIS — M25.511 PAIN IN RIGHT SHOULDER: ICD-10-CM

## 2021-07-31 DIAGNOSIS — Z91.040 LATEX ALLERGY STATUS: ICD-10-CM
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
## 2021-07-31 DIAGNOSIS — S50.811A ABRASION OF RIGHT FOREARM, INITIAL ENCOUNTER: ICD-10-CM
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
## 2021-07-31 DIAGNOSIS — Z88.2 ALLERGY STATUS TO SULFONAMIDES: ICD-10-CM

## 2021-07-31 PROCEDURE — 73110 X-RAY EXAM OF WRIST: CPT | Mod: RT

## 2021-07-31 PROCEDURE — 73080 X-RAY EXAM OF ELBOW: CPT | Mod: 26,RT

## 2021-07-31 PROCEDURE — 73110 X-RAY EXAM OF WRIST: CPT | Mod: 26,RT

## 2021-07-31 PROCEDURE — 73090 X-RAY EXAM OF FOREARM: CPT | Mod: RT

## 2021-07-31 PROCEDURE — 73080 X-RAY EXAM OF ELBOW: CPT | Mod: RT

## 2021-07-31 PROCEDURE — 99284 EMERGENCY DEPT VISIT MOD MDM: CPT | Mod: 25

## 2021-07-31 PROCEDURE — 99283 EMERGENCY DEPT VISIT LOW MDM: CPT

## 2021-07-31 PROCEDURE — 73030 X-RAY EXAM OF SHOULDER: CPT | Mod: 26,RT

## 2021-07-31 PROCEDURE — 73030 X-RAY EXAM OF SHOULDER: CPT | Mod: RT

## 2021-07-31 PROCEDURE — 73090 X-RAY EXAM OF FOREARM: CPT | Mod: 26,RT

## 2021-07-31 NOTE — ED PROVIDER NOTE - NSFOLLOWUPINSTRUCTIONS_ED_ALL_ED_FT
1. return for worsening symptoms or anything concerning to you  2. take all home meds as prescribed  3. follow up with your pmd call to make an appointment  4. Take Tylenol 650 mg every 6 hours as needed for pain.  5. Take motrin 600mg PO Q6 hours prn pain    Sprain    WHAT YOU NEED TO KNOW:    A sprain happens when a ligament is stretched or torn. Ligaments are tough tissues that connect bones. Ligaments support your joints and keep your bones in place. They allow you to lift, lower, or rotate your arms and legs. A sprain may involve one or more ligaments.     DISCHARGE INSTRUCTIONS:    Return to the emergency department if:     You have numbness or tingling below the injury, such as in your fingers or toes.      The skin over your sprained area is blue or pale.       Your pain has increased or returned, even after you take pain medicine.    Contact your healthcare provider if:     Your symptoms do not better.      Your swelling has increased or returned.      Your joint becomes more weak or unstable.      You have questions or concerns about your condition or care.    Medicines:     Prescription pain medicine may be given. Ask how to take this medicine safely.      Acetaminophen decreases pain and fever. It is available without a doctor's order. Ask how much to take and how often to take it. Follow directions. Acetaminophen can cause liver damage if not taken correctly.      NSAIDs, such as ibuprofen, help decrease swelling, pain, and fever. This medicine is available with or without a doctor's order. NSAIDs can cause stomach bleeding or kidney problems in certain people. If you take blood thinner medicine, always ask your healthcare provider if NSAIDs are safe for you. Always read the medicine label and follow directions.      Take your medicine as directed. Contact your healthcare provider if you think your medicine is not helping or if you have side effects. Tell him or her if you are allergic to any medicine. Keep a list of the medicines, vitamins, and herbs you take. Include the amounts, and when and why you take them. Bring the list or the pill bottles to follow-up visits. Carry your medicine list with you in case of an emergency.    Support devices: Support services, such as an elastic bandage, splint, brace, or cast may be needed. These devices limit your movement and protect your joint. You may need to use crutches if the sprain is in your leg. This will help decrease your pain as you move around.     Self-care:     Rest your joint so that it can heal. Return to normal activities as directed.      Apply ice on your injury for 15 to 20 minutes every hour or as directed. Use an ice pack, or put crushed ice in a plastic bag. Cover it with a towel. Ice helps prevent tissue damage and decreases swelling and pain.      Compress the injured area as directed. Ask your healthcare provider if you should wrap an elastic bandage around your injured ligament. An elastic bandage provides support and helps decrease swelling and movement so your joint can heal.       Elevate the injured area above the level of your heart as often as you can. This will help decrease swelling and pain. Prop the injured area on pillows or blankets to keep it elevated comfortably.     Physical therapy: A physical therapist teaches you exercises to help improve movement and strength, and to decrease pain.     Prevent another sprain: Regular exercise can strengthen your muscles and help prevent another injury. Do the following before you begin or return to regular exercise or sports training:     Ask your healthcare provider about the activities you can do. Find out how long your ligament needs to heal. Do not do any physical activity until your healthcare provider says it is okay. If you start activity too soon, you may develop a more serious injury.       Always warm up and stretch before your regular exercise, sport, or physical activity.       Take it slow. Slowly increase how often and how long you exercise or train. Sudden increases in how often you train may cause you to overstretch or tear your ligament.       Use the right equipment. Always wear shoes that fit well and are made for the activity that you are doing. You may also use ankle supports, elbow and knee pads, or braces.     Follow up with your healthcare provider as directed: Write down your questions so you remember to ask them during your visits.

## 2021-07-31 NOTE — ED ADULT NURSE NOTE - NSIMPLEMENTINTERV_GEN_ALL_ED
Implemented All Universal Safety Interventions:  [place] to call system. Call bell, personal items and telephone within reach. Instruct patient to call for assistance. Room bathroom lighting operational. Non-slip footwear when patient is off stretcher. Physically safe environment: no spills, clutter or unnecessary equipment. Stretcher in lowest position, wheels locked, appropriate side rails in place.

## 2021-07-31 NOTE — ED ADULT NURSE NOTE - OBJECTIVE STATEMENT
PT to ED AFTER FALLING DOWN 3 STEPS AROUND 0130 TODAY. DENIES LOC. ABRASIONS NOTED TO B/L WRISTS. NO USE OF ANTICOAGULANTS. DENIES PAIN. PT A&OX4.

## 2021-07-31 NOTE — ED PROVIDER NOTE - PROGRESS NOTE DETAILS
xray negative. wound cleaned. no need for sutures. will dc with follow up. Jozef Pennington M.D., Attending Physician

## 2021-07-31 NOTE — ED PROVIDER NOTE - PATIENT PORTAL LINK FT
You can access the FollowMyHealth Patient Portal offered by [facility] by registering at the following website: [email or web address]. By joining [employer]’s FollowMyHealth portal, you will also be able to view your health information using other applications (apps) compatible with our system.

## 2021-07-31 NOTE — ED PROVIDER NOTE - NS_ ATTENDINGSCRIBEDETAILS _ED_A_ED_FT
I, Jozef Pennington MD,  performed the initial face to face bedside interview with this patient regarding history of present illness, review of symptoms and relevant past medical, social and family history.  I completed an independent physical examination.  I was the initial provider who evaluated this patient.  The history, relevant review of systems, past medical and surgical history, medical decision making, and physical examination was documented by the scribe in my presence and I attest to the accuracy of the documentation.

## 2021-07-31 NOTE — ED ADULT NURSE NOTE - CHIEF COMPLAINT QUOTE
pt c/o fall. pt states she fell down 3 steps at about 0200. denies head strike/LOC. +pain to right forearm and right shoulder. Denies anticoagulants

## 2021-07-31 NOTE — ED PROVIDER NOTE - OBJECTIVE STATEMENT
69 y/o F with no significant PMHx, presents to ED s/p slip and fall three steps last night. Patient states she was carrying some things downstairs at 2 am when she fell down three steps. Denies hitting head, no LOC. Patient states she took tramadol for the pain in the forearm at 3 am. + pain in right forearm, +pain in shoulder, +abrasion right forearm. Patient is up to date with tetanus shot.

## 2021-07-31 NOTE — ED PROVIDER NOTE - PHYSICAL EXAMINATION
Constitutional: NAD AAOx3  Eyes: PERRLA EOMI  Head: Normocephalic atraumatic  ENT: No tan sign, no raccoon eyes, no hemotympanum, no csf rhinorrhea, no nasal septal hematoma  Mouth: MMM  Cardiac: regular rate, normal pulse.  Resp: Lungs CTAB  GI: Abd s/nt/nd  Neuro: CN2-12 intact GCS 15  Skin: 3 cm superficial abrasion forearm. Tenderness to forearm. Compartments soft.   Msk: No midline spinal ttp, full ROM of neck, c-collar cleared clinically and with provocative testing, no ttp of facial bones, no ttp to chest wall, pelvis stable, full ROM of all extremities without any ttp of extremities Constitutional: NAD AAOx3  Eyes: PERRLA EOMI  Head: Normocephalic atraumatic  ENT: No tan sign, no raccoon eyes, no csf rhinorrhea, no nasal septal hematoma  Mouth: MMM  Cardiac: regular rate, normal pulse.  Resp: Lungs CTAB  GI: Abd s/nt/nd  Neuro: CN2-12 intact GCS 15  Skin: 3 cm superficial abrasion forearm. Tenderness to forearm. Compartments soft.   Msk: No midline spinal ttp, full ROM of neck, c-collar cleared clinically and with provocative testing, no ttp of facial bones, no ttp to chest wall, pelvis stable, full ROM of all extremities

## 2021-07-31 NOTE — ED ADULT TRIAGE NOTE - CHIEF COMPLAINT QUOTE
pt c/o fall. pt states she fell down 3 steps at about 0200. denies head strike/LOC. +pain to right forearm and right shoulder. Denies anticoagulants
- - -

## 2021-07-31 NOTE — ED PROVIDER NOTE - NS ED ROS FT
Constitutional: No fever or chills  Eyes: No visual changes  HEENT: No throat pain  CV: No chest pain  Resp: No SOB no cough  GI: No abd pain, nausea or vomiting  : No dysuria  MSK: + pain in right forearm, + pain in shoulder   Skin: +Abrasion on right forearm.   Neuro: No headache

## 2022-07-20 NOTE — ED ADULT TRIAGE NOTE - NS ED NURSE DIRECT TO ROOM YN
Medicare Wellness Visit patient outreach outcome:  Unable to make appointment: Patient declined   
No

## 2023-11-03 NOTE — ED ADULT NURSE NOTE - PAIN: PRESENCE, MLM
Addended by: EBONY ARANGO on: 11/3/2023 10:52 AM     Modules accepted: Orders     complains of pain/discomfort

## 2025-06-17 ENCOUNTER — TRANSCRIPTION ENCOUNTER (OUTPATIENT)
Age: 75
End: 2025-06-17